# Patient Record
Sex: FEMALE | Race: WHITE | NOT HISPANIC OR LATINO | Employment: OTHER | URBAN - METROPOLITAN AREA
[De-identification: names, ages, dates, MRNs, and addresses within clinical notes are randomized per-mention and may not be internally consistent; named-entity substitution may affect disease eponyms.]

---

## 2017-02-17 ENCOUNTER — COMMUNICATION - HEALTHEAST (OUTPATIENT)
Dept: FAMILY MEDICINE | Facility: CLINIC | Age: 62
End: 2017-02-17

## 2017-02-17 DIAGNOSIS — E11.9 DIABETES (H): ICD-10-CM

## 2017-02-28 ENCOUNTER — COMMUNICATION - HEALTHEAST (OUTPATIENT)
Dept: FAMILY MEDICINE | Facility: CLINIC | Age: 62
End: 2017-02-28

## 2017-02-28 DIAGNOSIS — E11.9 DIABETES (H): ICD-10-CM

## 2017-04-08 ENCOUNTER — COMMUNICATION - HEALTHEAST (OUTPATIENT)
Dept: FAMILY MEDICINE | Facility: CLINIC | Age: 62
End: 2017-04-08

## 2017-04-08 DIAGNOSIS — E78.5 HYPERLIPIDEMIA: ICD-10-CM

## 2017-05-14 ENCOUNTER — COMMUNICATION - HEALTHEAST (OUTPATIENT)
Dept: FAMILY MEDICINE | Facility: CLINIC | Age: 62
End: 2017-05-14

## 2017-05-14 DIAGNOSIS — E78.5 HYPERLIPIDEMIA: ICD-10-CM

## 2017-07-06 ENCOUNTER — AMBULATORY - HEALTHEAST (OUTPATIENT)
Dept: MULTI SPECIALTY CLINIC | Facility: CLINIC | Age: 62
End: 2017-07-06

## 2017-07-08 ENCOUNTER — COMMUNICATION - HEALTHEAST (OUTPATIENT)
Dept: FAMILY MEDICINE | Facility: CLINIC | Age: 62
End: 2017-07-08

## 2017-07-08 DIAGNOSIS — E78.5 HYPERLIPIDEMIA: ICD-10-CM

## 2018-05-09 ENCOUNTER — OFFICE VISIT - HEALTHEAST (OUTPATIENT)
Dept: FAMILY MEDICINE | Facility: CLINIC | Age: 63
End: 2018-05-09

## 2018-05-09 DIAGNOSIS — R06.02 SHORTNESS OF BREATH: ICD-10-CM

## 2018-05-09 DIAGNOSIS — Z12.11 SCREEN FOR COLON CANCER: ICD-10-CM

## 2018-05-09 DIAGNOSIS — M79.89 LEG SWELLING: ICD-10-CM

## 2018-05-09 DIAGNOSIS — R06.02 SOB (SHORTNESS OF BREATH): ICD-10-CM

## 2018-05-09 DIAGNOSIS — Z12.31 VISIT FOR SCREENING MAMMOGRAM: ICD-10-CM

## 2018-05-09 LAB
ATRIAL RATE - MUSE: 65 BPM
DIASTOLIC BLOOD PRESSURE - MUSE: NORMAL MMHG
INTERPRETATION ECG - MUSE: NORMAL
P AXIS - MUSE: 44 DEGREES
PR INTERVAL - MUSE: 178 MS
QRS DURATION - MUSE: 94 MS
QT - MUSE: 426 MS
QTC - MUSE: 443 MS
R AXIS - MUSE: -28 DEGREES
SYSTOLIC BLOOD PRESSURE - MUSE: NORMAL MMHG
T AXIS - MUSE: 48 DEGREES
VENTRICULAR RATE- MUSE: 65 BPM

## 2018-05-09 ASSESSMENT — MIFFLIN-ST. JEOR: SCORE: 1306.53

## 2018-05-09 NOTE — ASSESSMENT & PLAN NOTE
Highly suspicious of left leg DVT as well as pulmonary embolism.  Likely onset of the DVT is 4/23/2018.  Likely onset of the pulmonary embolism was 4/26/2018.  There is positive history of a plane ride to [place] before this started and she was able to do everything she wanted to in [place] but did note leg pain and swelling in the left leg as well as shortness of breath fairly constantly throughout her trip and continuing until she has now returned here to [place].    Recommend: Ambulance to emergency room.  They declined this and planto drive straight to [facility] right now.  I did call over to [facility] and talk to a physician's assistant named Pam who understands that they will be coming over.  Plan for left lower leg ultrasound and CT PE run.    EKG was done today and there are some EKG changes noted from previous EKG done in 2015 these may or may not be related to a pulmonary embolism.  The patient is aware of this and still declined ambulance today

## 2018-05-14 ENCOUNTER — COMMUNICATION - HEALTHEAST (OUTPATIENT)
Dept: FAMILY MEDICINE | Facility: CLINIC | Age: 63
End: 2018-05-14

## 2018-05-14 ENCOUNTER — AMBULATORY - HEALTHEAST (OUTPATIENT)
Dept: LAB | Facility: CLINIC | Age: 63
End: 2018-05-14

## 2018-05-14 DIAGNOSIS — I26.99 OTHER ACUTE PULMONARY EMBOLISM WITHOUT ACUTE COR PULMONALE (H): ICD-10-CM

## 2018-05-14 DIAGNOSIS — I26.99 PULMONARY EMBOLISM (H): ICD-10-CM

## 2018-05-14 LAB — INR PPP: 1.4 (ref 0.9–1.1)

## 2018-05-17 ENCOUNTER — OFFICE VISIT - HEALTHEAST (OUTPATIENT)
Dept: FAMILY MEDICINE | Facility: CLINIC | Age: 63
End: 2018-05-17

## 2018-05-17 DIAGNOSIS — Z51.81 ANTICOAGULATION GOAL OF INR 2 TO 3: ICD-10-CM

## 2018-05-17 DIAGNOSIS — Z79.01 ANTICOAGULATION GOAL OF INR 2 TO 3: ICD-10-CM

## 2018-05-17 DIAGNOSIS — I26.99 PULMONARY EMBOLISM (H): ICD-10-CM

## 2018-05-17 LAB — INR PPP: 2.4 (ref 0.9–1.1)

## 2018-05-17 ASSESSMENT — MIFFLIN-ST. JEOR: SCORE: 1288.39

## 2018-05-17 NOTE — ASSESSMENT & PLAN NOTE
HOSPITAL FOLLOW UP DVT/PULMONARY EMBOLISM dx 5/9/2018 - needs anticoagulation therapy.  Lab Results   Component Value Date    INR 2.40 (H) 05/17/2018    INR 1.40 (H) 05/14/2018    INR 1.14 (H) 05/11/2018       Currently taking lovenox and coumadin.  Coumadin dose has been 5mg po q day for the past week.   Since she is therapeutic today will continue coumadin 5mg po q day  Discontinue lovenox  Follow up inr in one week and titrate if needed, but hopefully it will be stable.   She plans to return home to [place] 5/28/2018.    The fact that her leg on the left has been swollen for the past 1 year and since she started anticoagulation therapy has resolved makes me wonder if she has been having recurrent blood clots in that left leg over the past year.  Additionally,  she had a clot back in 1987 which was thought to be due to oral contraceptive pills.  This would make me lean towards chronic anticoagulation therapy.  I will defer to her primary physician who knows her better..

## 2018-05-24 ENCOUNTER — OFFICE VISIT - HEALTHEAST (OUTPATIENT)
Dept: FAMILY MEDICINE | Facility: CLINIC | Age: 63
End: 2018-05-24

## 2018-05-24 DIAGNOSIS — Z79.01 ANTICOAGULATION GOAL OF INR 2 TO 3: ICD-10-CM

## 2018-05-24 DIAGNOSIS — I26.99 OTHER ACUTE PULMONARY EMBOLISM WITHOUT ACUTE COR PULMONALE (H): ICD-10-CM

## 2018-05-24 DIAGNOSIS — Z51.81 ANTICOAGULATION GOAL OF INR 2 TO 3: ICD-10-CM

## 2018-05-24 DIAGNOSIS — I26.99 PULMONARY EMBOLISM (H): ICD-10-CM

## 2018-05-24 LAB — INR PPP: 3.5 (ref 0.9–1.1)

## 2018-05-24 NOTE — ASSESSMENT & PLAN NOTE
Lab Results   Component Value Date    INR 3.50 (H) 05/24/2018    INR 2.40 (H) 05/17/2018    INR 1.40 (H) 05/14/2018      She has been taking Coumadin 5 mg orally per day.  Because the goal INR is between 2 and 3 and she is at 3.5 today I will decrease her dose.    Start Coumadin 4 mg orally per day on Monday Wednesday and Friday.  Start Coumadin 5 mg orally per day on Tuesday, Thursday, Saturday and Sunday.    Recheck INR on 5/30/2018 or 5/31/2018 when she is back at home in [place].

## 2020-11-24 ENCOUNTER — OFFICE VISIT - HEALTHEAST (OUTPATIENT)
Dept: FAMILY MEDICINE | Facility: CLINIC | Age: 65
End: 2020-11-24

## 2020-11-24 DIAGNOSIS — R01.1 UNDIAGNOSED CARDIAC MURMURS: ICD-10-CM

## 2020-11-24 DIAGNOSIS — Z51.81 ENCOUNTER FOR THERAPEUTIC DRUG MONITORING: ICD-10-CM

## 2020-11-24 DIAGNOSIS — Z12.31 ENCOUNTER FOR SCREENING MAMMOGRAM FOR MALIGNANT NEOPLASM OF BREAST: ICD-10-CM

## 2020-11-24 DIAGNOSIS — E78.2 MIXED HYPERLIPIDEMIA: ICD-10-CM

## 2020-11-24 DIAGNOSIS — M06.9 RHEUMATOID ARTHRITIS INVOLVING BOTH HANDS, UNSPECIFIED WHETHER RHEUMATOID FACTOR PRESENT (H): ICD-10-CM

## 2020-11-24 DIAGNOSIS — I10 ESSENTIAL HYPERTENSION: ICD-10-CM

## 2020-11-24 DIAGNOSIS — K21.00 GASTROESOPHAGEAL REFLUX DISEASE WITH ESOPHAGITIS WITHOUT HEMORRHAGE: ICD-10-CM

## 2020-11-24 DIAGNOSIS — R51.9 NONINTRACTABLE HEADACHE, UNSPECIFIED CHRONICITY PATTERN, UNSPECIFIED HEADACHE TYPE: ICD-10-CM

## 2020-11-24 DIAGNOSIS — R13.10 DYSPHAGIA, UNSPECIFIED TYPE: ICD-10-CM

## 2020-11-24 DIAGNOSIS — M25.50 PAIN IN JOINT, MULTIPLE SITES: ICD-10-CM

## 2020-11-24 DIAGNOSIS — E11.8 TYPE 2 DIABETES MELLITUS WITH COMPLICATION, WITHOUT LONG-TERM CURRENT USE OF INSULIN (H): ICD-10-CM

## 2020-11-24 LAB
ALBUMIN SERPL-MCNC: 3.8 G/DL (ref 3.5–5)
ALP SERPL-CCNC: 107 U/L (ref 45–120)
ALT SERPL W P-5'-P-CCNC: 32 U/L (ref 0–45)
ANION GAP SERPL CALCULATED.3IONS-SCNC: 13 MMOL/L (ref 5–18)
AST SERPL W P-5'-P-CCNC: 30 U/L (ref 0–40)
BASOPHILS # BLD AUTO: 0.1 THOU/UL (ref 0–0.2)
BASOPHILS NFR BLD AUTO: 1 % (ref 0–2)
BILIRUB SERPL-MCNC: 0.7 MG/DL (ref 0–1)
BUN SERPL-MCNC: 24 MG/DL (ref 8–22)
CALCIUM SERPL-MCNC: 8.8 MG/DL (ref 8.5–10.5)
CHLORIDE BLD-SCNC: 102 MMOL/L (ref 98–107)
CO2 SERPL-SCNC: 24 MMOL/L (ref 22–31)
CREAT SERPL-MCNC: 1.04 MG/DL (ref 0.6–1.1)
EOSINOPHIL # BLD AUTO: 0.3 THOU/UL (ref 0–0.4)
EOSINOPHIL NFR BLD AUTO: 4 % (ref 0–6)
ERYTHROCYTE [DISTWIDTH] IN BLOOD BY AUTOMATED COUNT: 10.7 % (ref 11–14.5)
GFR SERPL CREATININE-BSD FRML MDRD: 53 ML/MIN/1.73M2
GLUCOSE BLD-MCNC: 206 MG/DL (ref 70–125)
HBA1C MFR BLD: 7.2 %
HCT VFR BLD AUTO: 45.1 % (ref 35–47)
HGB BLD-MCNC: 15.4 G/DL (ref 12–16)
LYMPHOCYTES # BLD AUTO: 2.3 THOU/UL (ref 0.8–4.4)
LYMPHOCYTES NFR BLD AUTO: 28 % (ref 20–40)
MCH RBC QN AUTO: 33.6 PG (ref 27–34)
MCHC RBC AUTO-ENTMCNC: 34.2 G/DL (ref 32–36)
MCV RBC AUTO: 98 FL (ref 80–100)
MONOCYTES # BLD AUTO: 0.9 THOU/UL (ref 0–0.9)
MONOCYTES NFR BLD AUTO: 10 % (ref 2–10)
NEUTROPHILS # BLD AUTO: 4.7 THOU/UL (ref 2–7.7)
NEUTROPHILS NFR BLD AUTO: 57 % (ref 50–70)
PLATELET # BLD AUTO: 284 THOU/UL (ref 140–440)
PMV BLD AUTO: 7.8 FL (ref 7–10)
POTASSIUM BLD-SCNC: 3.6 MMOL/L (ref 3.5–5)
PROT SERPL-MCNC: 7.1 G/DL (ref 6–8)
RBC # BLD AUTO: 4.58 MILL/UL (ref 3.8–5.4)
SODIUM SERPL-SCNC: 139 MMOL/L (ref 136–145)
WBC: 8.2 THOU/UL (ref 4–11)

## 2020-11-24 RX ORDER — DULOXETIN HYDROCHLORIDE 20 MG/1
20 CAPSULE, DELAYED RELEASE ORAL AT BEDTIME
Qty: 90 CAPSULE | Refills: 3 | Status: SHIPPED | OUTPATIENT
Start: 2020-11-24 | End: 2021-09-29

## 2020-11-24 RX ORDER — GABAPENTIN 300 MG/1
600 CAPSULE ORAL AT BEDTIME
Qty: 90 CAPSULE | Refills: 3 | Status: SHIPPED | OUTPATIENT
Start: 2020-11-24 | End: 2021-09-29

## 2020-11-24 NOTE — ASSESSMENT & PLAN NOTE
BP Readings from Last 3 Encounters:   11/24/20 100/60   05/24/18 110/62   05/17/18 100/62      discharge Cozaar 100mg po q day - need to discontinue as it may be causing a cough.    Increase hydrochlorothiazide 12.5 mg to 25 mg po q day.    Becauseof cough we need to discontinue cozaar - will increase hydrochlorothiazide and have her in for bp check in 1 month.

## 2020-11-24 NOTE — ASSESSMENT & PLAN NOTE
She takes omeprazole 20 m po q am - works well for gerd. Had egd in the past and she says it was benign.

## 2020-11-24 NOTE — ASSESSMENT & PLAN NOTE
Dueto the busy schedule and the amount of time I've already spent with her, time does not permit me to address this issues at today's visit. I've requested another appointment to review these additional issues. I have openings as soon as tomorrow.

## 2020-11-24 NOTE — ASSESSMENT & PLAN NOTE
She has not been seen by cardiology in over 5 years.  Needs reevaluation and updated recommendation. Cardiology consult ordered.

## 2020-11-24 NOTE — ASSESSMENT & PLAN NOTE
Has seen a rheumatologist in [place] most recently.  Will refer back to rheumatology here - saw Dr. Wagoner in the past.     Says she has been on methotrexate 2.5mg dose I do not prescribe this so referral to rheum placed.     She also takes gabapentin 600mg po q hs for pain, that is refilled today.     cymbalta 20mg po q day prescribed forpain. Refilled.

## 2020-11-24 NOTE — ASSESSMENT & PLAN NOTE
Diabetic eye exam scheduled for January  Continue Januvia 100mg po q day , prandin 2mg po bid, farxiga 5 mg po q day.     Recommend increase prandin to three times a day.     Plan recheck a1c in 3 months.

## 2020-11-25 ENCOUNTER — COMMUNICATION - HEALTHEAST (OUTPATIENT)
Dept: SCHEDULING | Facility: CLINIC | Age: 65
End: 2020-11-25

## 2020-12-08 ENCOUNTER — OFFICE VISIT - HEALTHEAST (OUTPATIENT)
Dept: CARDIOLOGY | Facility: CLINIC | Age: 65
End: 2020-12-08

## 2020-12-08 DIAGNOSIS — R06.09 DYSPNEA ON EXERTION: ICD-10-CM

## 2020-12-08 DIAGNOSIS — E78.5 DYSLIPIDEMIA, GOAL LDL BELOW 100: ICD-10-CM

## 2020-12-08 DIAGNOSIS — I10 ESSENTIAL HYPERTENSION: ICD-10-CM

## 2020-12-08 DIAGNOSIS — R01.1 HEART MURMUR: ICD-10-CM

## 2020-12-08 ASSESSMENT — MIFFLIN-ST. JEOR: SCORE: 1302

## 2020-12-11 ENCOUNTER — COMMUNICATION - HEALTHEAST (OUTPATIENT)
Dept: SCHEDULING | Facility: CLINIC | Age: 65
End: 2020-12-11

## 2020-12-23 ENCOUNTER — OFFICE VISIT - HEALTHEAST (OUTPATIENT)
Dept: FAMILY MEDICINE | Facility: CLINIC | Age: 65
End: 2020-12-23

## 2020-12-23 DIAGNOSIS — I10 ESSENTIAL HYPERTENSION: ICD-10-CM

## 2020-12-23 DIAGNOSIS — Z13.220 LIPID SCREENING: ICD-10-CM

## 2020-12-23 DIAGNOSIS — E78.5 DYSLIPIDEMIA, GOAL LDL BELOW 100: ICD-10-CM

## 2020-12-23 DIAGNOSIS — M25.50 PAIN IN JOINT, MULTIPLE SITES: ICD-10-CM

## 2020-12-23 DIAGNOSIS — R01.1 UNDIAGNOSED CARDIAC MURMURS: ICD-10-CM

## 2020-12-23 LAB
ANION GAP SERPL CALCULATED.3IONS-SCNC: 13 MMOL/L (ref 5–18)
BUN SERPL-MCNC: 26 MG/DL (ref 8–22)
CALCIUM SERPL-MCNC: 9.3 MG/DL (ref 8.5–10.5)
CHLORIDE BLD-SCNC: 102 MMOL/L (ref 98–107)
CHOLEST SERPL-MCNC: 173 MG/DL
CO2 SERPL-SCNC: 26 MMOL/L (ref 22–31)
CREAT SERPL-MCNC: 0.99 MG/DL (ref 0.6–1.1)
FASTING STATUS PATIENT QL REPORTED: YES
GFR SERPL CREATININE-BSD FRML MDRD: 56 ML/MIN/1.73M2
GLUCOSE BLD-MCNC: 240 MG/DL (ref 70–125)
HDLC SERPL-MCNC: 46 MG/DL
LDLC SERPL CALC-MCNC: 97 MG/DL
POTASSIUM BLD-SCNC: 4.1 MMOL/L (ref 3.5–5)
SODIUM SERPL-SCNC: 141 MMOL/L (ref 136–145)
TRIGL SERPL-MCNC: 148 MG/DL

## 2020-12-23 NOTE — ASSESSMENT & PLAN NOTE
She is waiting to make appt w rheum as they do everything virtually and she wasn't sure she should do it virtually, but now decided to go ahead.     Managed by Dr. Wagoner. She is planning to schedule appt w his team.

## 2020-12-23 NOTE — ASSESSMENT & PLAN NOTE
BP Readings from Last 3 Encounters:   12/23/20 124/80   12/08/20 140/90   11/24/20 100/60      Hydrochlorothiazide 25 mg po q day  Losartan 100mg po q day  prandin 2mg three times a day.  Due to the med changes will check bmp again.   Follow up in6 months

## 2021-01-04 ENCOUNTER — COMMUNICATION - HEALTHEAST (OUTPATIENT)
Dept: FAMILY MEDICINE | Facility: CLINIC | Age: 66
End: 2021-01-04

## 2021-01-14 ENCOUNTER — HOSPITAL ENCOUNTER (OUTPATIENT)
Dept: MRI IMAGING | Facility: HOSPITAL | Age: 66
Discharge: HOME | End: 2021-01-14
Attending: INTERNAL MEDICINE

## 2021-01-14 DIAGNOSIS — R01.1 HEART MURMUR: ICD-10-CM

## 2021-01-14 DIAGNOSIS — R06.09 DYSPNEA ON EXERTION: ICD-10-CM

## 2021-01-14 LAB
ATRIAL RATE - MUSE: 77 BPM
CCTA EJECTION FRACTION: 68 %
CCTA INTERVENTRICULAR SETPUM: 1.1 CM (ref 0.6–1.1)
CCTA LEFT INTERNAL DIMENSION IN SYSTOLE: 2.4 CM (ref 2.1–4)
CCTA LEFT VENTRICULAR INTERNAL DIMENSION IN DIASTOLE: 4 CM (ref 3.5–6)
CCTA LEFT VENTRICULAR MASS: 109.69 G
CCTA POSTERIOR WALL: 0.7 CM (ref 0.6–1.1)
DIASTOLIC BLOOD PRESSURE - MUSE: NORMAL
INTERPRETATION ECG - MUSE: NORMAL
MR CARDIAC LEFT VENTRIAL CARDIAC INDEX: 2.5 L/MIN/M2 (ref 1.75–3.8)
MR CARDIAC LEFT VENTRICAL CARDIAC OUTPUT: 4.5 L/MIN (ref 2.8–8.8)
MR CARDIAC LEFT VENTRICULAR DIASTOLIC VOLUME INDEX: 59.85 ML/M2 (ref 41–81)
MR CARDIAC LEFT VENTRICULAR MASS INDEX: 55.97 G/M2 (ref 63–95)
MR CARDIAC LEFT VENTRICULAR MASS: 101 G (ref 75–175)
MR CARDIAC LEFT VENTRICULAR STROKE VOLUME INDEX: 36.02 ML/M2 (ref 26–56)
MR CARDIAC LEFT VENTRICULAR SYSTOLIC VOLUME INDEX: 23.83 ML/M2 (ref 12–20)
MR EJECTION FRACTION: 60.19 %
MR HEIGHT: 1.6 M
MR LEFT VENTRICULAR DYSTOLIC VOLUMEN: 108 ML (ref 52–141)
MR LEFT VENTRICULAR STROKE VOLUMEN: 65 ML (ref 33–97)
MR LEFT VENTRICULAR SYSTOLIC VOLUME: 43 ML (ref 13–51)
MR WEIGHT: 77.1 KG
P AXIS - MUSE: 52 DEGREES
PR INTERVAL - MUSE: 180 MS
QRS DURATION - MUSE: 96 MS
QT - MUSE: 436 MS
QTC - MUSE: 493 MS
R AXIS - MUSE: -36 DEGREES
SYSTOLIC BLOOD PRESSURE - MUSE: NORMAL
T AXIS - MUSE: 39 DEGREES
VENTRICULAR RATE- MUSE: 77 BPM

## 2021-01-14 ASSESSMENT — MIFFLIN-ST. JEOR: SCORE: 1270.24

## 2021-01-15 ENCOUNTER — AMBULATORY - HEALTHEAST (OUTPATIENT)
Dept: CARDIOLOGY | Facility: CLINIC | Age: 66
End: 2021-01-15

## 2021-01-15 DIAGNOSIS — R06.09 DYSPNEA ON EXERTION: ICD-10-CM

## 2021-01-15 LAB
ATRIAL RATE - MUSE: 80 BPM
DIASTOLIC BLOOD PRESSURE - MUSE: NORMAL
INTERPRETATION ECG - MUSE: NORMAL
P AXIS - MUSE: 21 DEGREES
PR INTERVAL - MUSE: 184 MS
QRS DURATION - MUSE: 98 MS
QT - MUSE: 424 MS
QTC - MUSE: 489 MS
R AXIS - MUSE: -36 DEGREES
SYSTOLIC BLOOD PRESSURE - MUSE: NORMAL
T AXIS - MUSE: 44 DEGREES
VENTRICULAR RATE- MUSE: 80 BPM

## 2021-01-25 ENCOUNTER — RECORDS - HEALTHEAST (OUTPATIENT)
Dept: ADMINISTRATIVE | Facility: OTHER | Age: 66
End: 2021-01-25

## 2021-01-25 LAB — RETINOPATHY: NEGATIVE

## 2021-01-27 ENCOUNTER — HOSPITAL ENCOUNTER (OUTPATIENT)
Dept: CARDIOLOGY | Facility: HOSPITAL | Age: 66
Discharge: HOME | End: 2021-01-27
Attending: INTERNAL MEDICINE

## 2021-01-27 DIAGNOSIS — R06.09 DYSPNEA ON EXERTION: ICD-10-CM

## 2021-01-27 LAB
AORTIC VALVE MEAN VELOCITY: 139 CM/S
AR DECEL SLOPE: 2110 MM/S2
AR DECEL SLOPE: 2240 MM/S2
AR DECEL SLOPE: 2360 MM/S2
AR PEAK VELOCITY: 387 CM/S
AR PEAK VELOCITY: 397 CM/S
AR PEAK VELOCITY: 406 CM/S
AV MEAN GRADIENT: 9 MMHG
AV REGURGITATION PRESSURE HALF TIME: 504 MS
AV REGURGITATION PRESSURE HALF TIME: 521 MS
AV REGURGITATION PRESSURE HALF TIME: 538 MS
CV STRESS CURRENT BP HE: NORMAL
CV STRESS CURRENT HR HE: 101
CV STRESS CURRENT HR HE: 103
CV STRESS CURRENT HR HE: 104
CV STRESS CURRENT HR HE: 107
CV STRESS CURRENT HR HE: 107
CV STRESS CURRENT HR HE: 108
CV STRESS CURRENT HR HE: 109
CV STRESS CURRENT HR HE: 117
CV STRESS CURRENT HR HE: 127
CV STRESS CURRENT HR HE: 132
CV STRESS CURRENT HR HE: 72
CV STRESS CURRENT HR HE: 72
CV STRESS CURRENT HR HE: 74
CV STRESS CURRENT HR HE: 74
CV STRESS CURRENT HR HE: 75
CV STRESS CURRENT HR HE: 76
CV STRESS CURRENT HR HE: 81
CV STRESS CURRENT HR HE: 81
CV STRESS CURRENT HR HE: 87
CV STRESS CURRENT HR HE: 88
CV STRESS CURRENT HR HE: 90
CV STRESS CURRENT HR HE: 97
CV STRESS DEVIATION TIME HE: NORMAL
CV STRESS ECHO PERCENT HR HE: NORMAL
CV STRESS EXERCISE STAGE HE: NORMAL
CV STRESS FINAL RESTING BP HE: NORMAL
CV STRESS FINAL RESTING HR HE: 72
CV STRESS MAX HR HE: 132
CV STRESS MAX TREADMILL GRADE HE: 12
CV STRESS MAX TREADMILL SPEED HE: 2.5
CV STRESS PEAK DIA BP HE: NORMAL
CV STRESS PEAK SYS BP HE: NORMAL
CV STRESS PHASE HE: NORMAL
CV STRESS PROTOCOL HE: NORMAL
CV STRESS RESTING PT POSITION HE: NORMAL
CV STRESS RESTING PT POSITION HE: NORMAL
CV STRESS ST DEVIATION AMOUNT HE: NORMAL
CV STRESS ST DEVIATION ELEVATION HE: NORMAL
CV STRESS ST EVELATION AMOUNT HE: NORMAL
CV STRESS TEST TYPE HE: NORMAL
CV STRESS TOTAL STAGE TIME MIN 1 HE: NORMAL
DOP CALC AO PEAK VEL: 218 CM/S
DOP CALC AO PEAK VEL: 218 CM/S
DOP CALC AO VTI: 46.7 CM
DOP CALC MV VTI: 36.5 CM
DOP CALC MV VTI: 43.7 CM
DOP CALC MV VTI: 46 CM
DOP CALC MV VTI: 48.5 CM
MITRAL VALVE MEAN INFLOW VELOCITY: 129 CM/S
MITRAL VALVE MEAN INFLOW VELOCITY: 142 CM/S
MITRAL VALVE MEAN INFLOW VELOCITY: 151 CM/S
MITRAL VALVE MEAN INFLOW VELOCITY: 93.3 CM/S
MITRAL VALVE PEAK VELOCITY: 138 CM/S
MITRAL VALVE PEAK VELOCITY: 191 CM/S
MITRAL VALVE PEAK VELOCITY: 211 CM/S
MITRAL VALVE PEAK VELOCITY: 224 CM/S
MV MEAN GRADIENT: 11 MMHG
MV MEAN GRADIENT: 4 MMHG
MV MEAN GRADIENT: 8 MMHG
MV MEAN GRADIENT: 9 MMHG
RATE PRESSURE PRODUCT: NORMAL
STRESS ECHO BASELINE DIASTOLIC HE: 83
STRESS ECHO BASELINE HR: 72
STRESS ECHO BASELINE SYSTOLIC BP: 144
STRESS ECHO CALCULATED PERCENT HR: 86 %
STRESS ECHO LAST STRESS DIASTOLIC BP: 78
STRESS ECHO LAST STRESS HR: 127
STRESS ECHO LAST STRESS SYSTOLIC BP: 140
STRESS ECHO POST ESTIMATED WORKLOAD: 6.6
STRESS ECHO POST EXERCISE DUR MIN: 4
STRESS ECHO POST EXERCISE DUR SEC: 45
STRESS ECHO TARGET HR: 153
TRICUSPID VALVE PEAK REGURGITANT VELOCITY: 250 CM/S
TRICUSPID VALVE PEAK REGURGITANT VELOCITY: 252 CM/S
TRICUSPID VALVE PEAK REGURGITANT VELOCITY: 341 CM/S

## 2021-02-01 ENCOUNTER — RECORDS - HEALTHEAST (OUTPATIENT)
Dept: HEALTH INFORMATION MANAGEMENT | Facility: CLINIC | Age: 66
End: 2021-02-01

## 2021-02-10 ENCOUNTER — COMMUNICATION - HEALTHEAST (OUTPATIENT)
Dept: FAMILY MEDICINE | Facility: CLINIC | Age: 66
End: 2021-02-10

## 2021-02-10 DIAGNOSIS — I10 ESSENTIAL HYPERTENSION: ICD-10-CM

## 2021-02-10 DIAGNOSIS — E78.2 MIXED HYPERLIPIDEMIA: ICD-10-CM

## 2021-02-10 DIAGNOSIS — E78.5 DYSLIPIDEMIA, GOAL LDL BELOW 100: ICD-10-CM

## 2021-02-10 RX ORDER — HYDROCHLOROTHIAZIDE 25 MG/1
25 TABLET ORAL DAILY
Qty: 90 TABLET | Refills: 3 | Status: SHIPPED | OUTPATIENT
Start: 2021-02-10 | End: 2021-10-20

## 2021-02-10 RX ORDER — ATORVASTATIN CALCIUM 20 MG/1
20 TABLET, FILM COATED ORAL DAILY
Qty: 90 TABLET | Refills: 3 | Status: SHIPPED | OUTPATIENT
Start: 2021-02-10 | End: 2021-10-20

## 2021-02-10 NOTE — ASSESSMENT & PLAN NOTE
Lab Results   Component Value Date    CHOL 173 12/23/2020    CHOL 130 04/06/2015     149 06/16/2014     Lab Results   Component Value Date    HDL 46 (L) 12/23/2020     53 04/06/2015    HDL 48 06/16/2014     Lab Results   Component Value Date     97 12/23/2020    LDLCALC 62 04/06/2015    LDLCALC 81 06/16/2014     Lab Results   Component Value Date    TRIG 148 12/23/2020    TRIG 76 04/06/2015    TRIG 100 06/16/2014     No components found for: CHOLHDL     Refilled lipitor 20 mg po q day.

## 2021-02-10 NOTE — ASSESSMENT & PLAN NOTE
BP Readings from Last 3 Encounters:   01/14/21 117/61   12/23/20 124/80   12/08/20 140/90      Hydrochlorothiazide 25 mg po q day refilled.   Losartan 100 mg po q day

## 2021-02-12 ENCOUNTER — COMMUNICATION - HEALTHEAST (OUTPATIENT)
Dept: CARDIOLOGY | Facility: CLINIC | Age: 66
End: 2021-02-12

## 2021-02-15 ENCOUNTER — OFFICE VISIT - HEALTHEAST (OUTPATIENT)
Dept: CARDIOLOGY | Facility: CLINIC | Age: 66
End: 2021-02-15

## 2021-02-15 DIAGNOSIS — E78.5 DYSLIPIDEMIA, GOAL LDL BELOW 100: ICD-10-CM

## 2021-02-15 DIAGNOSIS — I10 ESSENTIAL HYPERTENSION: ICD-10-CM

## 2021-02-15 DIAGNOSIS — I35.0 MILD AORTIC VALVE STENOSIS: ICD-10-CM

## 2021-02-15 ASSESSMENT — MIFFLIN-ST. JEOR: SCORE: 1330.57

## 2021-02-23 ENCOUNTER — COMMUNICATION - HEALTHEAST (OUTPATIENT)
Dept: FAMILY MEDICINE | Facility: CLINIC | Age: 66
End: 2021-02-23

## 2021-02-23 DIAGNOSIS — E11.9 DIABETES MELLITUS (H): ICD-10-CM

## 2021-03-01 ENCOUNTER — COMMUNICATION - HEALTHEAST (OUTPATIENT)
Dept: FAMILY MEDICINE | Facility: CLINIC | Age: 66
End: 2021-03-01

## 2021-03-01 ENCOUNTER — COMMUNICATION - HEALTHEAST (OUTPATIENT)
Dept: ADMINISTRATIVE | Facility: CLINIC | Age: 66
End: 2021-03-01

## 2021-03-01 DIAGNOSIS — E11.8 TYPE 2 DIABETES MELLITUS WITH COMPLICATION, WITHOUT LONG-TERM CURRENT USE OF INSULIN (H): ICD-10-CM

## 2021-03-01 RX ORDER — NYSTATIN 100000/G
POWDER (GRAM) TOPICAL
Qty: 15 G | Refills: 3 | Status: SHIPPED | OUTPATIENT
Start: 2021-03-01 | End: 2021-10-15

## 2021-03-29 ENCOUNTER — COMMUNICATION - HEALTHEAST (OUTPATIENT)
Dept: FAMILY MEDICINE | Facility: CLINIC | Age: 66
End: 2021-03-29

## 2021-03-29 DIAGNOSIS — I10 ESSENTIAL HYPERTENSION: ICD-10-CM

## 2021-03-31 RX ORDER — LOSARTAN POTASSIUM 100 MG/1
100 TABLET ORAL DAILY
Qty: 90 TABLET | Refills: 0 | Status: SHIPPED | OUTPATIENT
Start: 2021-03-31 | End: 2021-10-20

## 2021-05-01 ENCOUNTER — HEALTH MAINTENANCE LETTER (OUTPATIENT)
Age: 66
End: 2021-05-01

## 2021-06-01 VITALS — WEIGHT: 179 LBS | BODY MASS INDEX: 31.71 KG/M2

## 2021-06-01 VITALS — HEIGHT: 63 IN | WEIGHT: 178 LBS | BODY MASS INDEX: 31.54 KG/M2

## 2021-06-01 VITALS — BODY MASS INDEX: 30.83 KG/M2 | WEIGHT: 174 LBS | HEIGHT: 63 IN

## 2021-06-05 ENCOUNTER — RECORDS - HEALTHEAST (OUTPATIENT)
Dept: FAMILY MEDICINE | Facility: CLINIC | Age: 66
End: 2021-06-05

## 2021-06-05 VITALS
DIASTOLIC BLOOD PRESSURE: 90 MMHG | RESPIRATION RATE: 14 BRPM | BODY MASS INDEX: 31.36 KG/M2 | SYSTOLIC BLOOD PRESSURE: 140 MMHG | HEIGHT: 63 IN | HEART RATE: 84 BPM | WEIGHT: 177 LBS

## 2021-06-05 VITALS
BODY MASS INDEX: 31.35 KG/M2 | OXYGEN SATURATION: 92 % | HEART RATE: 79 BPM | WEIGHT: 177 LBS | DIASTOLIC BLOOD PRESSURE: 60 MMHG | SYSTOLIC BLOOD PRESSURE: 100 MMHG

## 2021-06-05 VITALS
DIASTOLIC BLOOD PRESSURE: 66 MMHG | HEART RATE: 76 BPM | WEIGHT: 179.8 LBS | SYSTOLIC BLOOD PRESSURE: 118 MMHG | RESPIRATION RATE: 16 BRPM | BODY MASS INDEX: 30.7 KG/M2 | HEIGHT: 64 IN

## 2021-06-05 VITALS
SYSTOLIC BLOOD PRESSURE: 124 MMHG | HEART RATE: 80 BPM | WEIGHT: 178 LBS | RESPIRATION RATE: 16 BRPM | BODY MASS INDEX: 31.53 KG/M2 | DIASTOLIC BLOOD PRESSURE: 80 MMHG

## 2021-06-05 VITALS — WEIGHT: 170 LBS | HEIGHT: 63 IN | BODY MASS INDEX: 30.12 KG/M2

## 2021-06-05 DIAGNOSIS — M23.90 INTERNAL DERANGEMENT OF KNEE: ICD-10-CM

## 2021-06-05 DIAGNOSIS — M25.569 PAIN IN JOINT, LOWER LEG: ICD-10-CM

## 2021-06-08 ENCOUNTER — COMMUNICATION - HEALTHEAST (OUTPATIENT)
Dept: ADMINISTRATIVE | Facility: CLINIC | Age: 66
End: 2021-06-08

## 2021-06-08 DIAGNOSIS — E11.8 TYPE 2 DIABETES MELLITUS WITH COMPLICATION, WITHOUT LONG-TERM CURRENT USE OF INSULIN (H): ICD-10-CM

## 2021-06-09 RX ORDER — DAPAGLIFLOZIN 5 MG/1
5 TABLET, FILM COATED ORAL DAILY
Qty: 90 TABLET | Refills: 0 | Status: SHIPPED | OUTPATIENT
Start: 2021-06-09 | End: 2021-09-19

## 2021-06-09 RX ORDER — REPAGLINIDE 2 MG/1
2 TABLET ORAL
Qty: 270 TABLET | Refills: 0 | Status: SHIPPED | OUTPATIENT
Start: 2021-06-09 | End: 2021-09-29

## 2021-06-13 NOTE — TELEPHONE ENCOUNTER
Losartan was discontinue due to concern that it was causing a cough. Ok to restart, but will need video visit to change bp med if cough problem persists.

## 2021-06-13 NOTE — TELEPHONE ENCOUNTER
Two weeks ago saw Dr Gabriel.    Discontinued Losartan do to low bp.    On 8th at cardiology visit was 140/90    Today 164/115    Headache - constant and increasing in severity throughout the day. She said she's ready to go to bed by 6pm.    Lizabeth wants to know if she should restart her losartan.    Please address with Lizabeth.  She still has losartan at home.    Questions answered.  Message sent high priority to pcp.    COVID 19 Nurse Triage Plan/Patient Instructions    Please be aware that novel coronavirus (COVID-19) may be circulating in the community. If you develop symptoms such as fever, cough, or SOB or if you have concerns about the presence of another infection including coronavirus (COVID-19), please contact your health care provider or visit [email or web address].     Disposition/Instructions    Home care recommended. Follow home care protocol based instructions.    Thank you for taking steps to prevent the spread of this virus.  o Limit your contact with others.  o Wear a simple mask to cover your cough.  o Wash your hands well and often.    Resources    [facility]: About COVID-19: [email or web address]    CDC: What to Do If You're Sick: www.cdc.gov/coronavirus/2019-ncov/about/steps-when-sick.html    CDC: Ending Home Isolation: www.cdc.gov/coronavirus/2019-ncov/hcp/disposition-in-home-patients.html     CDC: Caring for Someone: www.cdc.gov/coronavirus/2019-ncov/if-you-are-sick/care-for-someone.html     [facility]: Interim Guidance for Hospital Discharge to Home: www.health.[facility].[place].us/diseases/coronavirus/hcp/hospdischarge.pdf    [facility] clinical trials (COVID-19 research studies): [email or web address]     Below are the COVID-19 hotlines at the [facility] of Health ([facility]). Interpreters are available.   o For health questions: Call 805-633-7569 or 1-209.388.7608 (7 a.m. to 7 p.m.)  o For questions about schools and childcare: Call 880-550-1585 or  8-468-639-9343 (7 a.m. to 7 p.m.)       Reason for Disposition    Systolic BP >= 180 OR Diastolic >= 110    Additional Information    Negative: Sounds like a life-threatening emergency to the triager    Negative: Pregnant > 20 weeks or postpartum (< 6 weeks after delivery) and new hand or face swelling    Negative: Pregnant > 20 weeks and BP > 140/90    Negative: Systolic BP >= 160 OR Diastolic >= 100, and any cardiac or neurologic symptoms (e.g., chest pain, difficulty breathing, unsteady gait, blurred vision)    Negative: Patient sounds very sick or weak to the triager    Negative: BP Systolic BP >= 140 OR Diastolic >= 90 and postpartum (from 0 to 6 weeks after delivery)    Negative: Systolic BP >= 180 OR Diastolic >= 110, and missed most recent dose of blood pressure medication    Protocols used: HIGH BLOOD PRESSURE-A-OH    Maricruz PATTERSON RN FNA

## 2021-06-13 NOTE — PATIENT INSTRUCTIONS - HE
Return in about 1 day (around 11/25/2020) for asap for headache, 1 month for htn and 3 months for diabetes.

## 2021-06-13 NOTE — TELEPHONE ENCOUNTER
Medication Question or Clarification  Who is calling: Patient  What medication are you calling about (include dose and sig)?:   losartan (COZAAR) 100 MG tablet   4/17/2018     Sig - Route: Take 100 mg by mouth daily.  - Oral    Class: Historical Med        Who prescribed the medication?: HIstorical  What is your question/concern?: When patient was seen yesterday she did not take home with her the summary.  Patient is asking if she is to continue with this medication as well as the new prescription she received yesterday for hydrochlorothiazide.  Please clarify.  Requested Pharmacy: [facility] to leave a detailed message?: Yes

## 2021-06-13 NOTE — TELEPHONE ENCOUNTER
We decided to discontinue the Cozaar because we thought it might be causing her chronic cough.  Also her blood pressure was really quite low yesterday and so we decided to just see how her blood pressure did without that medication and follow-up in 1 month at which time we can determine if the cough resolved and where her blood pressure landed and whether she needs additional medications.    So she should not continue the Cozaar.

## 2021-06-13 NOTE — PROGRESS NOTES
ASSESSMENT AND PLAN:    We spent 40 minutes today in direct patient contact, greater than 50% of the time in consultation concerning medical problems as listed below.     Problem List Items Addressed This Visit        Unprioritized    Arthralgias In Multiple Sites    Relevant Medications    DULoxetine (CYMBALTA) 20 MG capsule    gabapentin (NEURONTIN) 300 MG capsule    Other Relevant Orders    Ambulatory referral to Rheumatology    Rheumatoid arthritis (H)     Has seen a rheumatologist in [place] most recently.  Will refer back to rheumatology here - saw Dr. Wagoner in the past.     Says she has been on methotrexate 2.5mg dose I do not prescribe this so referral to rheum placed.     She also takes gabapentin 600mg po q hs for pain, that is refilled today.     cymbalta 20mg po q day prescribed for pain. Refilled.          Hypertension     BP Readings from Last 3 Encounters:   11/24/20 100/60   05/24/18 110/62   05/17/18 100/62      discharge Cozaar 100mg po q day - need to discontinue as it may be causing a cough.    Increase hydrochlorothiazide 12.5 mg to 25 mg po q day.    Because of cough we need to discontinue cozaar - will increase hydrochlorothiazide and have her in for bp check in 1 month.          Relevant Medications    hydroCHLOROthiazide (HYDRODIURIL) 25 MG tablet    Undiagnosed cardiac murmurs     She has not been seen by cardiology in over 5 years.  Needs reevaluation and updated recommendation. Cardiology consult ordered.          Relevant Orders    Ambulatory referral to Cardiology    Gastroesophageal reflux disease with esophagitis     She takes omeprazole 20 m po q am - works well for gerd. Had egd in the past and she says it was benign.         Relevant Medications    omeprazole (PRILOSEC) 20 MG capsule    RESOLVED: Type 2 diabetes mellitus with complication, without long-term current use of insulin (H) - Primary    Relevant Medications    SITagliptin (JANUVIA) 100 MG tablet    dapagliflozin  (FARXIGA) 5 mg Tab    repaglinide (PRANDIN) 2 MG tablet    Other Relevant Orders    Glycosylated Hemoglobin A1c (Completed)    RESOLVED: Dysphagia     Resolved per patient, she says this only happens very rarely.  egd done and no blockage seen           Other Visit Diagnoses     Encounter for therapeutic drug monitoring        Relevant Orders    Comprehensive Metabolic Panel    Lipid [place] FASTING    HM1(CBC and Differential) (Completed)    Encounter for screening mammogram for malignant neoplasm of breast        Relevant Orders    Mammo Screening Bilateral    Mixed hyperlipidemia        Relevant Medications    atorvastatin (LIPITOR) 20 MG tablet           Chief Complaint   Patient presents with     Diabetes     cough/ brain feels like its expandin?        HPI  Lizabeth Callahan is a 65 y.o. female comes in with several medication requests and concerns after being absent for over two years.  She has  complex medical issues with numerous medications.      She wants refill of gabapentin and cymbalta for her arthritis pains.  When asked where she has arthritis she says hands, feet, knees hips, all over really.  She used to see a rheumatologist but then was in [place] on and off so would see rhem in [place] too. She does want to see rheumatology to discuss and get refills of methotrexate.    She complains of a chronic cough, she feels it might be a medication side effect.     She also wants refill omeprazole which she has been using chronically and successfully for gerd.    She also has been having a headache. Because of all the above issues we did not really get time to discuss headache.      Social History     Tobacco Use   Smoking Status Never Smoker   Smokeless Tobacco Never Used      Current Outpatient Medications on File Prior to Visit   Medication Sig Dispense Refill     aspirin 81 MG EC tablet Take 81 mg by mouth daily.       blood sugar diagnostic (GLUCOSE BLOOD) Strp Patient Test One Time Daily-UNISTRIP TEST  STRIPS AND LANCETS. DX: Type 2  strip 3     cinnamon bark (CINNAMON) 500 mg capsule Take 500 mg by mouth daily.       losartan (COZAAR) 100 MG tablet Take 100 mg by mouth daily.        methotrexate 2.5 MG tablet Take 17.5 mg by mouth once a week.        nystatin (MYCOSTATIN) powder Apply to affected area 3 times a day as needed. 15 g 3     therapeutic multivitamin (THERAGRAN) tablet Take 1 tablet by mouth daily.       triamcinolone (KENALOG) 0.5 % cream Apply 1 application topically 3 (three) times a day as needed.        UNISTRIP1 TEST STRIP Strp TEST BLOOD SUGARS ONE TIME DAILY 1 strip PRN     [DISCONTINUED] atorvastatin (LIPITOR) 20 MG tablet Take 20 mg by mouth daily.        [DISCONTINUED] DULoxetine (CYMBALTA) 20 MG capsule Take 20 mg by mouth at bedtime.        [DISCONTINUED] gabapentin (NEURONTIN) 300 MG capsule Take 600 mg by mouth at bedtime.        [DISCONTINUED] hydroCHLOROthiazide (HYDRODIURIL) 12.5 MG tablet Take 12.5 mg by mouth daily.        [DISCONTINUED] omeprazole (PRILOSEC) 20 MG capsule Take 20 mg by mouth daily before breakfast.        [DISCONTINUED] repaglinide (PRANDIN) 2 MG tablet Take 1 mg by mouth 2 (two) times a day before meals.        multivit with minerals/lutein (MULTIVITAMIN 50 PLUS ORAL) multivitamin   once daily       [DISCONTINUED] lactobacillus acidophilus (ACIDOPHILUS) cap Take 1 capsule by mouth daily.        [DISCONTINUED] warfarin (COUMADIN) 4 MG tablet Take 4mg orally on Monday, Wednesday and Friday. 30 tablet 0     [DISCONTINUED] warfarin (COUMADIN) 5 MG tablet Take one tab daily on Tuesday, Thursday, Saturday and Sunday. 30 tablet 0     No current facility-administered medications on file prior to visit.       Allergies   Allergen Reactions     Apple Anaphylaxis     Red delicious apple     Hazelnut Anaphylaxis     Hazelnuts Anaphylaxis     Amoxicillin Nausea Only     Codeine Nausea Only     Hydrocodone-Acetaminophen Other (See Comments)     Low blood pressure      Sulfa (Sulfonamide Antibiotics)        Review of Systems   Constitutional: Negative.    HENT: Negative.    Eyes: Negative.    Respiratory: Negative.    Cardiovascular: Negative.    Gastrointestinal: Negative.    Endocrine: Negative.    Genitourinary: Negative.    Musculoskeletal: Negative.    Skin: Negative.    Neurological: Negative.    Hematological: Negative.    Psychiatric/Behavioral: Negative.         OBJECTIVE: /60 (Patient Site: Left Arm, Patient Position: Sitting, Cuff Size: Adult Large)   Pulse 79   Wt 177 lb (80.3 kg)   SpO2 92%   BMI 31.35 kg/m     Physical Exam  Constitutional:       General: She is not in acute distress.     Appearance: She is well-developed.   HENT:      Head: Normocephalic and atraumatic.   Eyes:      Conjunctiva/sclera: Conjunctivae normal.   Neck:      Musculoskeletal: Neck supple.   Cardiovascular:      Rate and Rhythm: Normal rate and regular rhythm.      Heart sounds: Normal heart sounds.   Pulmonary:      Effort: Pulmonary effort is normal.      Breath sounds: Normal breath sounds.   Abdominal:      Palpations: Abdomen is soft.   Musculoskeletal: Normal range of motion.   Skin:     General: Skin is warm and dry.   Neurological:      Mental Status: She is alert and oriented to person, place, and time.        Diabetic foot exam: normal DP and PT pulses, no trophic changes or ulcerative lesions and normal sensory exam       Additional History from Old Records Summarized (2): yes  Decision to Obtain Records (1): yes  Radiology Tests Summarized or Ordered (1): yes  Labs Reviewed or Ordered (1): yes  Medicine Test Summarized or Ordered (1): yes  Independent Review of EKG or X-RAY(2 each): no    This note was created using Dragon dictation.  Please excuse any grammatical errors.

## 2021-06-14 NOTE — PROGRESS NOTES
ASSESSMENT AND PLAN:      Problem List Items Addressed This Visit        Unprioritized    Dyslipidemia, goal LDL below 100     Ordered today, plan time to review statin decision aid at follow up          Arthralgias In Multiple Sites     She is waiting to make appt w rheum as they do everything virtually and she wasn't sure she should do it virtually, but now decided to go ahead.     Managed by Dr. Wagoner. She is planning to schedule appt w his team.            Hypertension - Primary     BP Readings from Last 3 Encounters:   12/23/20 124/80   12/08/20 140/90   11/24/20 100/60      Hydrochlorothiazide 25 mg po q day  Losartan 100mg po q day  prandin 2mg three times a day.  Due to the med changes will check bmp again.   Follow up in 6 months         Relevant Orders    Basic Metabolic Panel (Completed)    Undiagnosed cardiac murmurs     Saw cardiology 12/8/2020 - notes reviewed.   Follow up planned in 2 months (2/8/2020 or so)           Other Visit Diagnoses     Lipid screening        Relevant Orders    Lipid [place] FASTING (Completed)           Chief Complaint   Patient presents with     Blood Pressure Check        HPI  Lizabeth Callahan is a 65 y.o. female comes in for bp check and general follow up.     Social History     Tobacco Use   Smoking Status Never Smoker   Smokeless Tobacco Never Used      Current Outpatient Medications on File Prior to Visit   Medication Sig Dispense Refill     aspirin 81 MG EC tablet Take 81 mg by mouth daily.       atorvastatin (LIPITOR) 20 MG tablet Take 1 tablet (20 mg total) by mouth daily. 90 tablet 0     blood sugar diagnostic (GLUCOSE BLOOD) Strp Patient Test One Time Daily-UNISTRIP TEST STRIPS AND LANCETS. DX: Type 2  strip 3     cinnamon bark (CINNAMON) 500 mg capsule Take 500 mg by mouth daily.       dapagliflozin (FARXIGA) 5 mg Tab Take 5 mg by mouth daily. 90 tablet 0     DULoxetine (CYMBALTA) 20 MG capsule Take 1 capsule (20 mg total) by mouth at bedtime. 90 capsule 3      gabapentin (NEURONTIN) 300 MG capsule Take 2 capsules (600 mg total) by mouth at bedtime. 90 capsule 3     hydroCHLOROthiazide (HYDRODIURIL) 25 MG tablet Take 1 tablet (25 mg total) by mouth daily. 30 tablet 0     losartan (COZAAR) 100 MG tablet Take 100 mg by mouth daily.        metFORMIN (GLUCOPHAGE) 500 MG tablet Take 500 mg by mouth.       methotrexate 2.5 MG tablet Take 17.5 mg by mouth once a week.        multivit with minerals/lutein (MULTIVITAMIN 50 PLUS ORAL) multivitamin   once daily       nystatin (MYCOSTATIN) powder Apply to affected area 3 times a day as needed. 15 g 3     omeprazole (PRILOSEC) 20 MG capsule Take 1 capsule (20 mg total) by mouth daily before breakfast. 90 capsule 3     repaglinide (PRANDIN) 2 MG tablet Take 1 tablet (2 mg total) by mouth 3 (three) times a day before meals. 270 tablet 0     SITagliptin (JANUVIA) 100 MG tablet Take 1 tablet (100 mg total) by mouth daily. With or without food 90 tablet 3     therapeutic multivitamin (THERAGRAN) tablet Take 1 tablet by mouth daily.       triamcinolone (KENALOG) 0.5 % cream Apply 1 application topically 3 (three) times a day as needed.        UNISTRIP1 TEST STRIP Strp TEST BLOOD SUGARS ONE TIME DAILY 1 strip PRN     No current facility-administered medications on file prior to visit.       Allergies   Allergen Reactions     Apple Anaphylaxis     Red delicious apple     Hazelnut Anaphylaxis     Hazelnuts Anaphylaxis     Niacin Hives     Amoxicillin Nausea Only     Codeine Nausea Only     Hydrocodone-Acetaminophen Other (See Comments)     Low blood pressure     Sulfa (Sulfonamide Antibiotics)        Review of Systems   Constitutional: Negative.    HENT: Negative.    Eyes: Negative.    Respiratory: Negative.    Cardiovascular: Negative.    Gastrointestinal: Negative.    Endocrine: Negative.    Genitourinary: Negative.    Musculoskeletal: Negative.    Skin: Negative.    Neurological: Negative.    Hematological: Negative.     Psychiatric/Behavioral: Negative.         OBJECTIVE: /80   Pulse 80   Resp 16   Wt 178 lb (80.7 kg)   BMI 31.53 kg/m     Physical Exam  Constitutional:       General: She is not in acute distress.     Appearance: She is well-developed.   HENT:      Head: Normocephalic and atraumatic.   Eyes:      Conjunctiva/sclera: Conjunctivae normal.   Neck:      Musculoskeletal: Neck supple.   Cardiovascular:      Rate and Rhythm: Normal rate and regular rhythm.   Pulmonary:      Effort: Pulmonary effort is normal.   Musculoskeletal: Normal range of motion.   Skin:     General: Skin is warm and dry.   Neurological:      Mental Status: She is alert and oriented to person, place, and time.          Chart reviewed:   11/24/20 cbc ess nl, a1c down from 7.4 to 7.2. cmp shows elevated glucose (expected -diabetic), bun and gfr mildly abnormal, but creatinine was normal.  12/11/20     This note was created using Dragon dictation.  Please excuse any grammatical errors.

## 2021-06-14 NOTE — PROGRESS NOTES
----- Message -----  From: Mark Shaver MD  Sent: 1/15/2021  11:24 AM CST  To: China Charles RN    Please call the patient know that I have reviewed her stress cardiac MRI findings.      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    I would like to have exercise treadmill echo test (not for ischemia) to evaluate mitral valve gradients and tricuspid valve gradient to see if it is related to her dyspnea on exertion.    Thanks  Parrish    ----------------------------------------------------------------------------------------------  Exercise stress echo ordered.  -nasima

## 2021-06-15 NOTE — TELEPHONE ENCOUNTER
Reason for Call:  Medication or medication refill:  hydroCHLOROthiazide (HYDRODIURIL) 25 MG tablet 30 tablet     atorvastatin (LIPITOR) 20 MG tablet 90 tablet           Do you use a [facility]?  Name of the pharmacy and phone number for the current request: [facility]     Name of the medication requested:    hydroCHLOROthiazide (HYDRODIURIL) 25 MG tablet 30 tablet     atorvastatin (LIPITOR) 20 MG tablet 90 tablet         Other request:   Can we leave a detailed message on this number? Yes    Phone number patient can be reached at:   Cell number on file:    Telephone Information:   Mobile [phone]       Best Time:any    Call taken on 2/10/2021 at 9:07 AM by Danita Nunez

## 2021-06-15 NOTE — TELEPHONE ENCOUNTER
Reason for Call:  Other prescription      Detailed comments: Patient would like a refill on nystatin powder     [facility] [place] - [address]    Phone Number Patient can be reached at: [phone]     Best Time: anytime     Can we leave a detailed message on this number?: Yes

## 2021-06-15 NOTE — TELEPHONE ENCOUNTER
Reason for Call:  Medication or medication refill:    dapagliflozin (FARXIGA) 5 mg Tab    Do you use a [facility]?  Name of the pharmacy and phone number for the current request: [facility] [place]      Name of the medication requested: dapagliflozin (FARXIGA) 5 mg Tab    Other request: Pt is already out of the medication    Can we leave a detailed message on this number? No call back needed    Phone number patient can be reached at: Home number on file [phone] (home)    Best Time:     Call taken on 3/1/2021 at 11:31 AM by Pretty Hernandez

## 2021-06-15 NOTE — TELEPHONE ENCOUNTER
Problem List Items Addressed This Visit        Unprioritized    Dyslipidemia, goal LDL below 100     Lab Results   Component Value Date    CHOL 173 12/23/2020    CHOL 130 04/06/2015    CHOL 149 06/16/2014     Lab Results   Component Value Date    HDL 46 (L) 12/23/2020    HDL 53 04/06/2015    HDL 48 06/16/2014     Lab Results   Component Value Date    LDLCALC 97 12/23/2020    LDLCALC 62 04/06/2015    LDLCALC 81 06/16/2014     Lab Results   Component Value Date    TRIG 148 12/23/2020    TRIG 76 04/06/2015    TRIG 100 06/16/2014     No components found for: CHOLHDL     Refilled lipitor 20 mg po q day.          Relevant Medications    atorvastatin (LIPITOR) 20 MG tablet    Hypertension     BP Readings from Last 3 Encounters:   01/14/21 117/61   12/23/20 124/80   12/08/20 140/90      Hydrochlorothiazide 25 mg po q day refilled.   Losartan 100 mg po q day         Relevant Medications    hydroCHLOROthiazide (HYDRODIURIL) 25 MG tablet      Other Visit Diagnoses     Mixed hyperlipidemia        Relevant Medications    atorvastatin (LIPITOR) 20 MG tablet

## 2021-06-15 NOTE — TELEPHONE ENCOUNTER
RN cannot approve Refill Request    RN can NOT refill this medication med is not covered by policy/route to provider. Last office visit: 12/23/2020 Suzy Gabriel MD Last Physical: Visit date not found Last MTM visit: Visit date not found Last visit same specialty: 12/23/2020 Suzy Gabriel MD.  Next visit within 3 mo: Visit date not found  Next physical within 3 mo: Visit date not found      [facility], [facility] Triage/Med Refill 3/1/2021    Requested Prescriptions   Pending Prescriptions Disp Refills     FARXIGA 5 mg Tab [Pharmacy Med Name: DAPAGLIFLOZIN  5MG TAB] 90 tablet 0     Sig: TAKE ONE TABLET BY MOUTH EVERY DAY       There is no refill protocol information for this order

## 2021-06-16 PROBLEM — Z86.711 HX PULMONARY EMBOLISM: Status: ACTIVE | Noted: 2018-05-09

## 2021-06-16 PROBLEM — Z51.81 ANTICOAGULATION GOAL OF INR 2 TO 3: Status: ACTIVE | Noted: 2018-05-17

## 2021-06-16 PROBLEM — N13.30 HYDRONEPHROSIS: Status: ACTIVE | Noted: 2017-12-15

## 2021-06-16 PROBLEM — K21.00 GASTROESOPHAGEAL REFLUX DISEASE WITH ESOPHAGITIS: Status: ACTIVE | Noted: 2020-11-24

## 2021-06-16 PROBLEM — R51.9 HEADACHE: Status: ACTIVE | Noted: 2020-11-24

## 2021-06-16 PROBLEM — Z79.01 ANTICOAGULATION GOAL OF INR 2 TO 3: Status: ACTIVE | Noted: 2018-05-17

## 2021-06-16 PROBLEM — R93.2 ABNORMAL FINDINGS ON DIAGNOSTIC IMAGING OF LIVER AND BILIARY TRACT: Status: ACTIVE | Noted: 2017-12-15

## 2021-06-16 NOTE — TELEPHONE ENCOUNTER
Reason for Call:  Medication or medication refill:  losartan (COZAAR) 100 MG tablet   4/17/2018  --   Sig - Route: Take 100 mg by mouth daily         Do you use a [facility]?  Name of the pharmacy and phone number for the current request:[facility]     Name of the medication requested:   losartan (COZAAR) 100 MG tablet   4/17/2018  --   Sig - Route: Take 100 mg by mouth daily         Other request: would like a 90 supply if possible.   Can we leave a detailed message on this number? Yes    Phone number patient can be reached at:   Cell number on file:    Telephone Information:   Mobile [phone]       Best Time: any    Call taken on 3/29/2021 at 8:47 AM by Danita Nunez

## 2021-06-17 NOTE — PROGRESS NOTES
ASSESSMENT AND PLAN:     Problem List Items Addressed This Visit        High    Shortness of breath     Highly suspicious of left leg DVT as well as pulmonary embolism.  Likely onset of the DVT is 4/23/2018.  Likely onset of the pulmonary embolism was 4/26/2018.  There is positive history of a plane ride to [place] before this started and she was able to do everything she wanted to in [place] but did note leg pain and swelling in the left leg as well as shortness of breath fairly constantly throughout her trip and continuing until she has now returned here to [place].    Recommend: Ambulance to emergency room.  They declined this and plan to drive straight to [facility] right now.  I did call over to [facility] and talk to a physician's assistant named Pam who understands that they will be coming over.  Plan for left lower leg ultrasound and CT PE run.    EKG was done today and there are some EKG changes noted from previous EKG done in 2015 these may or may not be related to a pulmonary embolism.  The patient is aware of this and still declined ambulance today               Other Visit Diagnoses     Visit for screening mammogram    -  Primary    Screen for colon cancer        SOB (shortness of breath)        Relevant Orders    Electrocardiogram Perform - Clinic (Completed)    Leg swelling               Chief Complaint   Patient presents with     Leg Pain     Patient states she has been having pain and swelling in her left leg - calf area. Also has some pain in her upper hip area as well.         JADEN  Lizabeth Callahan is a 63 y.o. female who is new to me today and has not been seen in [place] since 2015, complains of chronic swelling left leg for the past year and new pain in the left lower leg without trauma since 4/23/2018 she flew to [place] on 4/22/2018.  Then on 4/26/2018 she was at about 700 feet elevation and started to notice some shortness of breath with that did not resolve when she came down from  elevation.  She is continued to have the left leg swelling, pain and shortness of breath since 4/26/2018.  She tells me that the pain gets worse when she flexes her left foot.  She is also noticed some coughing along with the shortness of breath intermittently.    DVT/PE risk factors: She was on a plane going to [place] [employer] around the time of onset.  She has a history of a DVT in the left leg in 1972 when she was on oral contraceptive pills.  She is not currently on hormone replacement therapy, has no history of malignancy and does not smoke.    She says she actually feels pretty well right now except for that left leg pain and she knows that she is got a little bit of shortness of breath although it is not super bothersome at the moment    They actually live in [facility] [place] and are newly moved back to [place].  She is here with her  Ryan.     History   Smoking Status     Never Smoker   Smokeless Tobacco     Never Used      Current Outpatient Prescriptions on File Prior to Visit   Medication Sig Dispense Refill     atorvastatin (LIPITOR) 40 MG tablet TAKE ONE TABLET BY MOUTH ONCE DAILY (DUE  FOR  ANNUAL  EXAM  PRIOR  TO  NEXT  REFILL  PLEASE  SCHEDULE) 30 tablet 0     blood sugar diagnostic (GLUCOSE BLOOD) Strp Patient Test One Time Daily-UNISTRIP TEST STRIPS AND LANCETS. DX: Type 2  strip 3     BLOOD-GLUCOSE METER,MOBILE DEV MISC Test one time daily       cinnamon bark (CINNAMON) 500 mg capsule Take 500 mg by mouth daily.       lactobacillus acidophilus (ACIDOPHILUS) cap Take 1 capsule by mouth.       losartan (COZAAR) 50 MG tablet Take 25 mg by mouth daily. TAKE ONE TABLET BY MOUTH EVERY DAY       nystatin (MYCOSTATIN) powder Apply to affected area 3 times a day as needed. 15 g 3     omeprazole (PRILOSEC) 20 MG capsule Take 20 mg by mouth daily.       therapeutic multivitamin (THERAGRAN) tablet Take 1 tablet by mouth daily.       triamcinolone (KENALOG) 0.5 % cream Apply topically 3 (three)  "times a day.       UNISTRIP1 TEST STRIP Strp TEST BLOOD SUGARS ONE TIME DAILY 1 strip PRN     metFORMIN (GLUCOPHAGE) 500 MG tablet TAKE ONE TABLET BY MOUTH TWICE DAILY WITH MEALS 180 tablet 0     methotrexate 2.5 MG tablet Take 8 tablets (20 mg total) by mouth once a week. 64 tablet 0     No current facility-administered medications on file prior to visit.       Allergies   Allergen Reactions     Amoxicillin      Codeine      Hydrocodone-Acetaminophen      Sulfa (Sulfonamide Antibiotics)          Review of Systems   Constitutional: Negative.    HENT: Negative.    Eyes: Negative.    Respiratory: Positive for cough (for the past two week.) and shortness of breath.    Cardiovascular: Positive for leg swelling (left leg swelling and pain.).   Gastrointestinal: Negative.    Endocrine: Negative.    Genitourinary: Negative.    Musculoskeletal: Negative.    Skin: Negative.    Neurological: Negative.    Hematological: Negative.    Psychiatric/Behavioral: Negative.         OBJECTIVE: /82  Pulse 68  Resp 14  Ht 5' 3\" (1.6 m)  Wt 178 lb (80.7 kg)  Breastfeeding? No  BMI 31.53 kg/m2   Physical Exam   Constitutional: She is oriented to person, place, and time. She appears well-developed and well-nourished. No distress.   HENT:   Head: Normocephalic and atraumatic.   Eyes: Conjunctivae are normal.   Neck: Neck supple.   Cardiovascular: Normal rate, regular rhythm and normal heart sounds.    Pulmonary/Chest: Effort normal and breath sounds normal. No respiratory distress.   No increased work of breathing   Musculoskeletal: Normal range of motion.   Neurological: She is alert and oriented to person, place, and time.   Skin: Skin is warm and dry.   Psychiatric: She has a normal mood and affect.      ekg - new changes suspicious for age indeterminate infarct.     This note was created using Dragon dictation.  Please excuse any grammatical errors.    "

## 2021-06-18 NOTE — PROGRESS NOTES
SUBJECTIVE: Lizabeth Callahan is a 63 y.o. female comes in for hospital follow up.  She says that she is doing well and she is very pleased that her left leg is finally no longer swollen.  She says she has had left leg swelling for the past 1 year nearly constantly.  He had thought that this was a chronic condition but now that it has resolved she is wondering if maybe she has just had a blood clot for this entire time.    DATE OF ADMISSION: 5/9/2018  DATE OF DISCHARGE: 5/11/2018  DISCHARGE DIAGNOSIS: Acute PE and acute DVT of the left leg  BRIEF HOSPITAL COURSE: Was noted to have left leg swelling and shortness of breath after a trip to [place].  She was found to have a pulmonary embolism likely secondary to a left leg DVT.  She was placed on anticoagulation therapy.  IMPORTANT PENDING RESULTS: none, but needs inr anticoag therapy managed.     Patient Active Problem List   Diagnosis     Right Trapezoid Muscle Strain     Osteoarthritis Of The Knee     Seborrheic Keratosis     Type 2 Diabetes Mellitus - Uncomplicated, Controlled     Hyperlipidemia     Hypertension     Arthralgias In Multiple Sites     Myalgia And Myositis     Rheumatoid Arthritis     Generalized Osteoarthritis Of The Hand     Localized Primary Osteoarthritis Of The Carpometacarpal Joint     Calcaneal Spur     Foot Pain (Soft Tissue)     Pain During Urination (Dysuria)     Joint Pain, Localized In The Knee     Acute Sinusitis     Dysuria     UTI (lower urinary tract infection)     Rheumatoid arthritis     Shortness of breath     Pulmonary embolism     Pulmonary emboli     Acute deep vein thrombosis (DVT) of proximal vein of left lower extremity     Chest pain, unspecified type     Type 2 diabetes mellitus with complication, without long-term current use of insulin     Accelerated hypertension     Left leg pain     Leg edema, left     Past Medical History:   Diagnosis Date     Hyperlipemia      Hypertension      Myalgia      Osteoarthritis      Rheumatoid  arthritis      Seborrheic keratoses      Type 2 diabetes mellitus      Current Outpatient Prescriptions   Medication Sig Dispense Refill     atorvastatin (LIPITOR) 20 MG tablet Take 20 mg by mouth daily.        blood sugar diagnostic (GLUCOSE BLOOD) Strp Patient Test One Time Daily-UNISTRIP TEST STRIPS AND LANCETS. DX: Type 2  strip 3     cinnamon bark (CINNAMON) 500 mg capsule Take 500 mg by mouth daily.       DULoxetine (CYMBALTA) 20 MG capsule Take 20 mg by mouth at bedtime.        enoxaparin (LOVENOX) 80 mg/0.8 mL syringe Inject 0.8 mL (80 mg total) under the skin every 12 (twelve) hours for 4 days. 8 Syringe 0     gabapentin (NEURONTIN) 300 MG capsule Take 600 mg by mouth at bedtime.        hydroCHLOROthiazide (HYDRODIURIL) 12.5 MG tablet Take 12.5 mg by mouth daily.        lactobacillus acidophilus (ACIDOPHILUS) cap Take 1 capsule by mouth daily.        losartan (COZAAR) 100 MG tablet Take 100 mg by mouth daily.        methotrexate 2.5 MG tablet Take 17.5 mg by mouth once a week.        nystatin (MYCOSTATIN) powder Apply to affected area 3 times a day as needed. 15 g 3     omeprazole (PRILOSEC) 20 MG capsule Take 20 mg by mouth daily before breakfast.        repaglinide (PRANDIN) 2 MG tablet Take 1 mg by mouth 2 (two) times a day before meals.        therapeutic multivitamin (THERAGRAN) tablet Take 1 tablet by mouth daily.       triamcinolone (KENALOG) 0.5 % cream Apply 1 application topically 3 (three) times a day as needed.        UNISTRIP1 TEST STRIP Strp TEST BLOOD SUGARS ONE TIME DAILY 1 strip PRN     warfarin (COUMADIN) 5 MG tablet Take one tab daily till repeat INR on Monday 120 tablet 0     No current facility-administered medications for this visit.      Allergies   Allergen Reactions     Apple Anaphylaxis     Red delicious apple     Hazelnuts Anaphylaxis     Amoxicillin Nausea Only     Codeine Nausea Only     Hydrocodone-Acetaminophen Other (See Comments)     Low blood pressure     Sulfa  "(Sulfonamide Antibiotics)          OBJECTIVE: /62 (Patient Site: Left Arm, Patient Position: Sitting, Cuff Size: Adult Large)  Pulse 68  Ht 5' 3\" (1.6 m)  Wt 174 lb (78.9 kg)  BMI 30.82 kg/m2  Physical Exam   Constitutional: She is oriented to person, place, and time. She appears well-developed and well-nourished. No distress.   HENT:   Head: Normocephalic and atraumatic.   Eyes: Conjunctivae are normal.   Neck: Neck supple.   Cardiovascular: Normal rate and regular rhythm.    Pulmonary/Chest: Effort normal.   Musculoskeletal: Normal range of motion.   Legs were examined in both legs reveal no pedal edema.  In fact she says that her legs have not looked this normal in a year.  Her left leg has been chronically swollen for 1 year.   Neurological: She is alert and oriented to person, place, and time.   Skin: Skin is warm and dry.   Psychiatric: She has a normal mood and affect.        ASSESSMENT/ PLAN:  HOSPITAL FOLLOW UP DVT/PULMONARY EMBOLISM- needs anticoagulation therapy.  Lab Results   Component Value Date    INR 2.40 (H) 05/17/2018    INR 1.40 (H) 05/14/2018    INR 1.14 (H) 05/11/2018       Currently taking lovenox and coumadin.  Coumadin dose has been 5mg po q day for the past week.   Since she is therapeutic today will continue coumadin 5mg po q day  Discontinue lovenox  Follow up inr in one week and titrate if needed, but hopefully it will be stable.   She plans to return home to [place] 5/28/2018.    The fact that her leg on the left has been swollen for the past 1 year and since she started anticoagulation therapy has resolved makes me wonder if she has been having recurrent blood clots in that left leg over the past year.  Additionally,  she had a clot back in 1987 which was thought to be due to oral contraceptive pills.  This would make me lean towards chronic anticoagulation therapy.  I will defer to her primary physician who knows her better..  "

## 2021-06-18 NOTE — PROGRESS NOTES
ASSESSMENT AND PLAN:     Problem List Items Addressed This Visit        Unprioritized    Pulmonary embolism    Pulmonary emboli    Relevant Medications    warfarin (COUMADIN) 5 MG tablet    Anticoagulation goal of INR 2 to 3     Lab Results   Component Value Date    INR 3.50 (H) 05/24/2018    INR 2.40 (H) 05/17/2018    INR 1.40 (H) 05/14/2018      She has been taking Coumadin 5 mg orally per day.  Because the goal INR is between 2 and 3 and she is at 3.5 today I will decrease her dose.    Start Coumadin 4 mg orally per day on Monday Wednesday and Friday.  Start Coumadin 5 mg orally per day on Tuesday, Thursday, Saturday and Sunday.    Recheck INR on 5/30/2018 or 5/31/2018 when she is back at home in [place].                Chief Complaint   Patient presents with     Follow-up        HPI  Lizabeth Callahan is a 63 y.o. female comes in to follow up on coumadin titration and needs inr today.     She takes Coumadin 5mg / day.  last inr was 2.4     then she goes to [place] 5/28/2018 (which is her home)    History   Smoking Status     Never Smoker   Smokeless Tobacco     Never Used      Current Outpatient Prescriptions on File Prior to Visit   Medication Sig Dispense Refill     atorvastatin (LIPITOR) 20 MG tablet Take 20 mg by mouth daily.        blood sugar diagnostic (GLUCOSE BLOOD) Strp Patient Test One Time Daily-UNISTRIP TEST STRIPS AND LANCETS. DX: Type 2  strip 3     cinnamon bark (CINNAMON) 500 mg capsule Take 500 mg by mouth daily.       DULoxetine (CYMBALTA) 20 MG capsule Take 20 mg by mouth at bedtime.        gabapentin (NEURONTIN) 300 MG capsule Take 600 mg by mouth at bedtime.        hydroCHLOROthiazide (HYDRODIURIL) 12.5 MG tablet Take 12.5 mg by mouth daily.        lactobacillus acidophilus (ACIDOPHILUS) cap Take 1 capsule by mouth daily.        losartan (COZAAR) 100 MG tablet Take 100 mg by mouth daily.        methotrexate 2.5 MG tablet Take 17.5 mg by mouth once a week.        nystatin  (MYCOSTATIN) powder Apply to affected area 3 times a day as needed. 15 g 3     omeprazole (PRILOSEC) 20 MG capsule Take 20 mg by mouth daily before breakfast.        repaglinide (PRANDIN) 2 MG tablet Take 1 mg by mouth 2 (two) times a day before meals.        therapeutic multivitamin (THERAGRAN) tablet Take 1 tablet by mouth daily.       triamcinolone (KENALOG) 0.5 % cream Apply 1 application topically 3 (three) times a day as needed.        UNISTRIP1 TEST STRIP Strp TEST BLOOD SUGARS ONE TIME DAILY 1 strip PRN     [DISCONTINUED] warfarin (COUMADIN) 5 MG tablet Take one tab daily till repeat INR on Monday 120 tablet 0     No current facility-administered medications on file prior to visit.       Allergies   Allergen Reactions     Apple Anaphylaxis     Red delicious apple     Hazelnuts Anaphylaxis     Amoxicillin Nausea Only     Codeine Nausea Only     Hydrocodone-Acetaminophen Other (See Comments)     Low blood pressure     Sulfa (Sulfonamide Antibiotics)          Review of Systems   Constitutional: Negative.    HENT: Negative.    Eyes: Negative.    Respiratory: Negative.    Cardiovascular: Negative.    Gastrointestinal: Negative.    Endocrine: Negative.    Genitourinary: Negative.    Musculoskeletal: Negative.    Skin: Negative.    Neurological: Negative.    Hematological: Negative.    Psychiatric/Behavioral: Negative.         OBJECTIVE: /62 (Patient Site: Left Arm, Patient Position: Sitting, Cuff Size: Adult Regular)  Wt 179 lb (81.2 kg)  BMI 31.71 kg/m2     Physical Exam   Constitutional: She is oriented to person, place, and time. She appears well-developed and well-nourished. No distress.   HENT:   Head: Normocephalic and atraumatic.   Eyes: Conjunctivae are normal.   Neck: Neck supple.   Cardiovascular: Normal rate and regular rhythm.    Pulmonary/Chest: Effort normal.   Musculoskeletal: Normal range of motion.   Neurological: She is alert and oriented to person, place, and time.   Skin: Skin is warm  and dry.   Psychiatric: She has a normal mood and affect.      Lab Results   Component Value Date    INR 3.50 (H) 05/24/2018    INR 2.40 (H) 05/17/2018    INR 1.40 (H) 05/14/2018      This note was created using Dragon dictation.  Please excuse any grammatical errors.

## 2021-06-20 ENCOUNTER — HEALTH MAINTENANCE LETTER (OUTPATIENT)
Age: 66
End: 2021-06-20

## 2021-06-21 NOTE — LETTER
Letter by Suzy Gabriel MD at      Author: Suzy Gabriel MD Service: -- Author Type: --    Filed:  Encounter Date: 1/4/2021 Status: (Other)         Lizabeth Callahan  [address] [place] [id number]             January 4, 2021         Dear Ms. Callahan,    Below are the results from your recent visit:    Resulted Orders   Lipid [place] FASTING   Result Value Ref Range    Cholesterol 173 <=199 mg/dL    Triglycerides 148 <=149 mg/dL    HDL Cholesterol 46 (L) >=50 mg/dL    LDL Calculated 97 <=129 mg/dL    Patient Fasting > 8hrs? Yes    Basic Metabolic Panel   Result Value Ref Range    Sodium 141 136 - 145 mmol/L    Potassium 4.1 3.5 - 5.0 mmol/L    Chloride 102 98 - 107 mmol/L    CO2 26 22 - 31 mmol/L    Anion Gap, Calculation 13 5 - 18 mmol/L    Glucose 240 (H) 70 - 125 mg/dL    Calcium 9.3 8.5 - 10.5 mg/dL    BUN 26 (H) 8 - 22 mg/dL    Creatinine 0.99 0.60 - 1.10 mg/dL    GFR MDRD Af Amer >60 >60 mL/min/1.73m2    GFR MDRD Non Af Amer 56 (L) >60 mL/min/1.73m2    Narrative    Fasting Glucose reference range is 70-99 mg/dL per  American Diabetes Association (ADA) guidelines.     Please call with questions or contact us using [employer].  Sincerely,  Electronically signed by Suzy Gabriel MD

## 2021-06-23 ENCOUNTER — COMMUNICATION - HEALTHEAST (OUTPATIENT)
Dept: FAMILY MEDICINE | Facility: CLINIC | Age: 66
End: 2021-06-23

## 2021-06-24 ENCOUNTER — COMMUNICATION - HEALTHEAST (OUTPATIENT)
Dept: FAMILY MEDICINE | Facility: CLINIC | Age: 66
End: 2021-06-24

## 2021-06-25 ENCOUNTER — AMBULATORY - HEALTHEAST (OUTPATIENT)
Dept: LAB | Facility: CLINIC | Age: 66
End: 2021-06-25

## 2021-06-25 DIAGNOSIS — E87.6 HYPOKALEMIA: ICD-10-CM

## 2021-06-25 DIAGNOSIS — E80.6 HYPERBILIRUBINEMIA: ICD-10-CM

## 2021-06-25 LAB
BILIRUB DIRECT SERPL-MCNC: 0.5 MG/DL
POTASSIUM BLD-SCNC: 3.2 MMOL/L (ref 3.5–5)

## 2021-06-25 NOTE — TELEPHONE ENCOUNTER
Left message to call back for: Lizabeth  Information to relay to patient: does she have enough meds to get to her appt or should we send some in?

## 2021-06-25 NOTE — TELEPHONE ENCOUNTER
Reason for Call:  Medication or medication refill:    repaglinide (PRANDIN) 2 MG tablet    dapagliflozin (FARXIGA) 5 mg Tab    Do you use a [facility]?  Name of the pharmacy and phone number for the current request: [facility] - [address] [facility]    Name of the medication requested: repaglinide (PRANDIN) 2 MG tablet    dapagliflozin (FARXIGA) 5 mg Tab    Other request:     Can we leave a detailed message on this number? No call back needed    Phone number patient can be reached at: Home number on file [phone] (home)    Best Time:     Call taken on 6/8/2021 at 11:02 AM by Pretty Hernandez

## 2021-06-26 NOTE — TELEPHONE ENCOUNTER
Left message to call back for: Lizabeth   Information to relay to patient:  See message below from .   Needs repeat labs done on Friday of this week.  Lab only ok.       Mary Ellen Carreno CMA 6/23/2021 4:51 PM   Pam HOOD

## 2021-06-26 NOTE — TELEPHONE ENCOUNTER
----- Message from Suzy Gabriel MD sent at 6/23/2021  4:46 PM CDT -----  Call with low potassium  Potassium is 3.2 (normal is 3.5-5.0) so this is mildly low.  Please advise her to eat more potassium rich foods - avocado, spinach, sweet potato, wild caught salmon, banana  Schedule f/u to evaluate /recheck level in next couple days, possibly check EKG  Low potassium can cause heart irregularity and pt should be seen in ER with any concerns     This can cause the muscle cramps that she was complaining of.     Bilirubin was also a little high, and I can recheck that too.

## 2021-06-30 ENCOUNTER — COMMUNICATION - HEALTHEAST (OUTPATIENT)
Dept: FAMILY MEDICINE | Facility: CLINIC | Age: 66
End: 2021-06-30

## 2021-06-30 ENCOUNTER — AMBULATORY - HEALTHEAST (OUTPATIENT)
Dept: LAB | Facility: CLINIC | Age: 66
End: 2021-06-30

## 2021-06-30 DIAGNOSIS — E87.6 HYPOKALEMIA: ICD-10-CM

## 2021-06-30 LAB — POTASSIUM BLD-SCNC: 3.2 MMOL/L (ref 3.5–5)

## 2021-06-30 RX ORDER — POTASSIUM CHLORIDE 1500 MG/1
20 TABLET, EXTENDED RELEASE ORAL 2 TIMES DAILY
Qty: 60 TABLET | Refills: 11 | Status: SHIPPED | OUTPATIENT
Start: 2021-06-30 | End: 2021-07-28

## 2021-06-30 NOTE — PROGRESS NOTES
Progress Notes by Mark Shaver MD at 2/15/2021 10:10 AM     Author: Mark Shaver MD Service: -- Author Type: Physician    Filed: 2/15/2021 12:22 PM Encounter Date: 2/15/2021 Status: Signed    : Mark Shaver MD (Physician)           Click to link to [facility] HEART CARE NOTE       Assessment/Plan:   1.  Mild rheumatic valvular disease, mild aortic valve stenosis: The patient had negative stress cardiac MRI.  She has normal heart function. Rheumatic valve pathological change in mitral, aortic and tricuspid valves, mild aortic valve stenosis per cardiac MRI findings.  Continue to observe her, no intervention at this time.  We discussed lifestyle modification including increased exercise activities.    2.  Essential hypertension: Continue hydrochlorothiazide 25 mg daily, losartan 100 mg daily.  Monitor her blood pressure.    3.  Dyslipidemia: Continue Lipitor.    4.  Type 2 diabetes: No change in her medication today.    Thank you for the opportunity to be involved in the care of Lizabeth Callahan. If you have any questions, please feel free to contact me.  I will see the patient again in 12 months and as needed.    Much or all of the text in this note was generated through the use of Dragon Dictate voice-to-text software. Errors in spelling or words which seem out of context are unintentional.   Sound alike errors, in particular, may have escaped editing.       History of Present Illness:   It is my pleasure to see Lizabeth Callahan at the [facility] clinic for evaluation of Follow-up and discussing stress cardiac MRI report. Lizabeth Callahan is a 65 y.o. female with a medical history of type 2 diabetes, essential hypertension, dyslipidemia, history of pulmonary embolism.    The patient states that she has no chest pain.  She has mild dyspnea on exertion which has been stable.  She has occasional palpitations.  She had no dizziness, lightheadedness, syncope.  She has no orthopnea, PND  or leg edema.  Her blood pressure and heart rate are controlled    Past Medical History:     Patient Active Problem List   Diagnosis   ? Right Trapezoid Muscle Strain   ? Osteoarthritis Of The Knee   ? Seborrheic Keratosis   ? Diabetes mellitus (H)   ? Dyslipidemia, goal LDL below 100   ? Arthralgias In Multiple Sites   ? Myalgia And Myositis   ? Rheumatoid arthritis (H)   ? Generalized Osteoarthritis Of The Hand   ? Localized Primary Osteoarthritis Of The Carpometacarpal Joint   ? Calcaneal Spur   ? Foot Pain (Soft Tissue)   ? Joint Pain, Localized In The Knee   ? Acute Sinusitis   ? Dysuria   ? Hx pulmonary embolism   ? Chest pain, unspecified type   ? Hypertension   ? Left leg pain   ? Leg edema, left   ? Anticoagulation goal of INR 2 to 3   ? Abnormal findings on diagnostic imaging of liver and biliary tract   ? Diverticulosis of colon   ? Hydronephrosis   ? Insomnia   ? Low back pain   ? Undiagnosed cardiac murmurs   ? Gastroesophageal reflux disease with esophagitis   ? Headache   ? Diverticulosis       Past Surgical History:     Past Surgical History:   Procedure Laterality Date   ? BACK SURGERY      x 3   ? CARPAL TUNNEL RELEASE      2015   ? HYSTERECTOMY         Family History:     Family History   Problem Relation Age of Onset   ? Cancer Mother    ? Cancer Father    ? Diabetes Brother    ? Diabetes Sister        Social History:    reports that she has never smoked. She has never used smokeless tobacco. She reports current alcohol use. She reports that she does not use drugs.    Review of Systems:   General: WNL  Eyes: WNL  Ears/Nose/Throat: WNL  Lungs: Cough  Heart: WNL  Stomach: WNL  Bladder: WNL  Muscle/Joints: Joint Pain  Skin: WNL  Nervous System: WNL  Mental Health: WNL     Blood: WNL    Meds:     Current Outpatient Medications:   ?  aspirin 81 MG EC tablet, Take 81 mg by mouth daily., Disp: , Rfl:   ?  atorvastatin (LIPITOR) 20 MG tablet, Take 1 tablet (20 mg total) by mouth daily., Disp: 90 tablet,  Rfl: 3  ?  blood sugar diagnostic (GLUCOSE BLOOD) Strp, Patient Test One Time Daily-UNISTRIP TEST STRIPS AND LANCETS. DX: Type 2 DM, Disp: 100 strip, Rfl: 3  ?  cinnamon bark (CINNAMON) 500 mg capsule, Take 500 mg by mouth daily., Disp: , Rfl:   ?  dapagliflozin (FARXIGA) 5 mg Tab, Take 5 mg by mouth daily., Disp: 90 tablet, Rfl: 0  ?  DULoxetine (CYMBALTA) 20 MG capsule, Take 1 capsule (20 mg total) by mouth at bedtime., Disp: 90 capsule, Rfl: 3  ?  gabapentin (NEURONTIN) 300 MG capsule, Take 2 capsules (600 mg total) by mouth at bedtime., Disp: 90 capsule, Rfl: 3  ?  hydroCHLOROthiazide (HYDRODIURIL) 25 MG tablet, Take 1 tablet (25 mg total) by mouth daily., Disp: 90 tablet, Rfl: 3  ?  losartan (COZAAR) 100 MG tablet, Take 100 mg by mouth daily. , Disp: , Rfl:   ?  methotrexate 2.5 MG tablet, Take 17.5 mg by mouth once a week. , Disp: , Rfl:   ?  multivit with minerals/lutein (MULTIVITAMIN 50 PLUS ORAL), multivitamin  once daily, Disp: , Rfl:   ?  nystatin (MYCOSTATIN) powder, Apply to affected area 3 times a day as needed., Disp: 15 g, Rfl: 3  ?  omeprazole (PRILOSEC) 20 MG capsule, Take 1 capsule (20 mg total) by mouth daily before breakfast., Disp: 90 capsule, Rfl: 3  ?  repaglinide (PRANDIN) 2 MG tablet, Take 1 tablet (2 mg total) by mouth 3 (three) times a day before meals., Disp: 270 tablet, Rfl: 0  ?  SITagliptin (JANUVIA) 100 MG tablet, Take 1 tablet (100 mg total) by mouth daily. With or without food, Disp: 90 tablet, Rfl: 3  ?  UNISTRIP1 TEST STRIP Strp, TEST BLOOD SUGARS ONE TIME DAILY, Disp: 1 strip, Rfl: PRN  ?  metFORMIN (GLUCOPHAGE) 500 MG tablet, Take 500 mg by mouth., Disp: , Rfl:   ?  therapeutic multivitamin (THERAGRAN) tablet, Take 1 tablet by mouth daily., Disp: , Rfl:   ?  triamcinolone (KENALOG) 0.5 % cream, Apply 1 application topically 3 (three) times a day as needed. , Disp: , Rfl:      Allergies:   Apple, Hazelnut, Hazelnuts, Niacin, Amoxicillin, Codeine, Hydrocodone-acetaminophen, and  "Sulfa (sulfonamide antibiotics)    Objective:      Physical Exam  179 lb 12.8 oz (81.6 kg)  5' 4\" (1.626 m)  Body mass index is 30.86 kg/m .  /66 (Patient Site: Left Arm, Patient Position: Sitting, Cuff Size: Adult Regular)   Pulse 76   Resp 16   Ht 5' 4\" (1.626 m)   Wt 179 lb 12.8 oz (81.6 kg)   BMI 30.86 kg/m      General Appearance:   Awake, Alert, No acute distress.   HEENT:  Pupil equal, reactive to light. No scleral icterus; the mucous membranes were moist. No oral ulcers or thrush.    Neck: No cervical bruits. No JVD. No thyromegaly. No lymph node enlargement or tenderness.   Chest: The spine was straight. The chest was symmetric.   Lungs:   Respirations unlabored. Lungs are clear to auscultation. No crackles. No wheezing.   Cardiovascular:   RRR, normal first and second heart sounds with II/VI early to mid systolic murmurs at RUSB. No rubs or gallops.    Abdomen:  Soft. No tenderness. Non-distended. Bowels sounds are present   Extremities: Equal posterior tibial pulses. No leg edema.   Skin: No rashes or ulcers. Warm, Dry.   Musculoskeletal: No tenderness. No deformity.   Neurologic: Mood and affect are appropriate. No focal deficits.         EKG:  Personally reivewed  Sinus rhythm with Possible Premature atrial complexes with Aberrant conduction   Leftward axis   Septal infarct (cited on or before 09-MAY-2018)   Abnormal ECG   When compared with ECG of 09-MAY-2018 09:05,   Aberrant conduction is now Present   Questionable change in initial forces of Septal leads    Cardiac Imaging Studies  ECHO on 5-:    Left Ventricle: Normal left ventricular size.The estimated left ventricular ejection fraction is 65%. This represents a normal ejection fraction. Moderate sigmoid septum hypertrophy noted. E/e' > 15, suggesting elevated LV filling pressures.    Left Atrium: Left atrial volume is mildly increased.    Normal right ventricular size and systolic function.    Thoracic Aorta: The ascending aorta " is mildly dilated.    Normal central venous pressure.    The aortic valve is sclerotic without reduced excursion. Mild aortic regurgitation.    No hemodynamically significant valvular heart abnormalities.    No previous study for comparison.    Stress cardiac MRI on 1-:  1.  Lexiscan stress cardiac MRI is negative for inducible myocardial ischemia.   2.  Lexiscan stress ECG is negative for inducible myocardial ischemia.  3.  No previous myocardial infarction is identified.  4.  Normal left ventricular size, wall thickness and function. The calculated left ventricular ejection fraction is 60%.  5.  Normal right ventricular size and systolic function.  6.  Moderately enlarged left atrium.  7.  Rheumatic valve pathological change in mitral, aortic and tricuspid valves. Mild aortic valve stenosis.     Lab Review   Lab Results   Component Value Date     12/23/2020    K 4.1 12/23/2020     12/23/2020    CO2 26 12/23/2020    BUN 26 (H) 12/23/2020    CREATININE 0.99 12/23/2020    CALCIUM 9.3 12/23/2020     Lab Results   Component Value Date    WBC 8.2 11/24/2020    WBC 16.4 (H) 04/06/2015    HGB 15.4 11/24/2020    HCT 45.1 11/24/2020    MCV 98 11/24/2020     11/24/2020     Lab Results   Component Value Date    CHOL 173 12/23/2020    TRIG 148 12/23/2020    HDL 46 (L) 12/23/2020     Lab Results   Component Value Date    TROPONINI 0.01 05/10/2018     Lab Results   Component Value Date     (H) 05/09/2018     Lab Results   Component Value Date    TSH 2.38 05/09/2018

## 2021-06-30 NOTE — PROGRESS NOTES
Progress Notes by Mark Shaver MD at 12/8/2020  8:50 AM     Author: Mark Shaver MD Service: -- Author Type: Physician    Filed: 12/8/2020  9:25 AM Encounter Date: 12/8/2020 Status: Signed    : Mark Shaver MD (Physician)           Click to link to [facility] NOTE    Thank you, Dr. De Jesus, for asking me to see Lizabeth Callahan in consultation at [facility] to evaluate heart murmur and dyspnea on exertion.      Assessment/Plan:   1.  Heart murmur and dyspnea on exertion: The patient has previous aortic valve disorder with mild aortic valve regurgitation per echocardiogram in 2018.  She developed a dyspnea on exertion, gasping air over last 3 to 4 months.  This could be anginal equivalent because she has diabetes.  She does have a several risk factors including her age, type 2 diabetes, hypertension, dyslipidemia.  Her brother has bypass surgery recently.  After discussion, stress cardiac MRI is requested for evaluation of myocardial ischemia, heart function and structure, aortic valve morphology and function.  Based on cardiac MRI findings, we will discuss the next step.    2.  Essential hypertension: Continue hydrochlorothiazide 25 mg daily, losartan 100 mg daily.  Monitor her blood pressure.    3.  Dyslipidemia: Continue Lipitor.    4.  Type 2 diabetes: No change in her medication today.    Thank you for the opportunity to be involved in the care of Lizabeth Callahan. If you have any questions, please feel free to contact me.  I will see the patient again in 2 months and as needed.    Much or all of the text in this note was generated through the use of Dragon Dictate voice-to-text software. Errors in spelling or words which seem out of context are unintentional.   Sound alike errors, in particular, may have escaped editing.       History of Present Illness:   It is my pleasure to see Lizabeth Callahan at the [facility] for evaluation of  Follow-up. Lizabeth Callahan is a 65 y.o. female with a medical history of type 2 diabetes, essential hypertension, dyslipidemia, history of pulmonary embolism.    The patient is referred to cardiology clinic for evaluation of heart murmur and also dyspnea on exertion.  She states that she has no chest pain, but she has exertional shortness of breath, gasping air few times in a hour.  This is new to her over last 3 to 4 months.  She has occasional palpitations.  Her balance has been off a little bit.  She had no syncope.  She has no orthopnea, PND or leg edema.  Her blood pressure and heart rate are controlled    Past Medical History:     Patient Active Problem List   Diagnosis   ? Right Trapezoid Muscle Strain   ? Osteoarthritis Of The Knee   ? Seborrheic Keratosis   ? Type 2 Diabetes Mellitus - Uncomplicated, Controlled   ? Hyperlipidemia   ? Arthralgias In Multiple Sites   ? Myalgia And Myositis   ? Rheumatoid arthritis (H)   ? Generalized Osteoarthritis Of The Hand   ? Localized Primary Osteoarthritis Of The Carpometacarpal Joint   ? Calcaneal Spur   ? Foot Pain (Soft Tissue)   ? Joint Pain, Localized In The Knee   ? Acute Sinusitis   ? Dysuria   ? Hx pulmonary embolism   ? Chest pain, unspecified type   ? Hypertension   ? Left leg pain   ? Leg edema, left   ? Anticoagulation goal of INR 2 to 3   ? Abnormal findings on diagnostic imaging of liver and biliary tract   ? Diverticulosis of colon   ? Hydronephrosis   ? Insomnia   ? Low back pain   ? Undiagnosed cardiac murmurs   ? Gastroesophageal reflux disease with esophagitis   ? Headache       Past Surgical History:     Past Surgical History:   Procedure Laterality Date   ? BACK SURGERY      x 3   ? CARPAL TUNNEL RELEASE      2015   ? HYSTERECTOMY         Family History:     Family History   Problem Relation Age of Onset   ? Cancer Mother    ? Cancer Father    ? Diabetes Brother    ? Diabetes Sister        Social History:    reports that she has never smoked. She  has never used smokeless tobacco. She reports current alcohol use. She reports that she does not use drugs.    Review of Systems:   General: WNL  Eyes: Visual Distubance  Ears/Nose/Throat: WNL  Lungs: WNL  Heart: Leg Swelling, Irregular Heartbeat  Stomach: WNL  Bladder: WNL  Muscle/Joints: WNL  Skin: Rash  Nervous System: Loss of Balance  Mental Health: WNL     Blood: Easy Bruising    Meds:     Current Outpatient Medications:   ?  aspirin 81 MG EC tablet, Take 81 mg by mouth daily., Disp: , Rfl:   ?  atorvastatin (LIPITOR) 20 MG tablet, Take 1 tablet (20 mg total) by mouth daily., Disp: 90 tablet, Rfl: 0  ?  blood sugar diagnostic (GLUCOSE BLOOD) Strp, Patient Test One Time Daily-UNISTRIP TEST STRIPS AND LANCETS. DX: Type 2 DM, Disp: 100 strip, Rfl: 3  ?  cinnamon bark (CINNAMON) 500 mg capsule, Take 500 mg by mouth daily., Disp: , Rfl:   ?  dapagliflozin (FARXIGA) 5 mg Tab, Take 5 mg by mouth daily., Disp: 90 tablet, Rfl: 0  ?  DULoxetine (CYMBALTA) 20 MG capsule, Take 1 capsule (20 mg total) by mouth at bedtime., Disp: 90 capsule, Rfl: 3  ?  gabapentin (NEURONTIN) 300 MG capsule, Take 2 capsules (600 mg total) by mouth at bedtime., Disp: 90 capsule, Rfl: 3  ?  hydroCHLOROthiazide (HYDRODIURIL) 25 MG tablet, Take 1 tablet (25 mg total) by mouth daily., Disp: 30 tablet, Rfl: 0  ?  multivit with minerals/lutein (MULTIVITAMIN 50 PLUS ORAL), multivitamin  once daily, Disp: , Rfl:   ?  nystatin (MYCOSTATIN) powder, Apply to affected area 3 times a day as needed., Disp: 15 g, Rfl: 3  ?  omeprazole (PRILOSEC) 20 MG capsule, Take 1 capsule (20 mg total) by mouth daily before breakfast., Disp: 90 capsule, Rfl: 3  ?  repaglinide (PRANDIN) 2 MG tablet, Take 1 tablet (2 mg total) by mouth 3 (three) times a day before meals., Disp: 270 tablet, Rfl: 0  ?  SITagliptin (JANUVIA) 100 MG tablet, Take 1 tablet (100 mg total) by mouth daily. With or without food, Disp: 90 tablet, Rfl: 3  ?  UNISTRIP1 TEST STRIP Strp, TEST BLOOD SUGARS  "ONE TIME DAILY, Disp: 1 strip, Rfl: PRN  ?  losartan (COZAAR) 100 MG tablet, Take 100 mg by mouth daily. , Disp: , Rfl:   ?  methotrexate 2.5 MG tablet, Take 17.5 mg by mouth once a week. , Disp: , Rfl:   ?  therapeutic multivitamin (THERAGRAN) tablet, Take 1 tablet by mouth daily., Disp: , Rfl:   ?  triamcinolone (KENALOG) 0.5 % cream, Apply 1 application topically 3 (three) times a day as needed. , Disp: , Rfl:      Allergies:   Apple, Hazelnut, Hazelnuts, Amoxicillin, Codeine, Hydrocodone-acetaminophen, and Sulfa (sulfonamide antibiotics)    Objective:      Physical Exam  177 lb (80.3 kg)  5' 3\" (1.6 m)  Body mass index is 31.35 kg/m .  /90 (Patient Site: Right Arm, Patient Position: Sitting, Cuff Size: Adult Large)   Pulse 84   Resp 14   Ht 5' 3\" (1.6 m)   Wt 177 lb (80.3 kg)   BMI 31.35 kg/m      General Appearance:   Awake, Alert, No acute distress.   HEENT:  Pupil equal, reactive to light. No scleral icterus; the mucous membranes were moist. No oral ulcers or thrush.    Neck: No cervical bruits. No JVD. No thyromegaly. No lymph node enlargement or tenderness.   Chest: The spine was straight. The chest was symmetric.   Lungs:   Respirations unlabored. Lungs are clear to auscultation. No crackles. No wheezing.   Cardiovascular:   RRR, normal first and second heart sounds with II/VI early to mid systolic murmurs at RUSB. No rubs or gallops.    Abdomen:  Soft. No tenderness. Non-distended. Bowels sounds are present   Extremities: Equal posterior tibial pulses. No leg edema.   Skin: No rashes or ulcers. Warm, Dry.   Musculoskeletal: No tenderness. No deformity.   Neurologic: Mood and affect are appropriate. No focal deficits.         EKG:  Personally reivewed  Sinus rhythm with Possible Premature atrial complexes with Aberrant conduction   Leftward axis   Septal infarct (cited on or before 09-MAY-2018)   Abnormal ECG   When compared with ECG of 09-MAY-2018 09:05,   Aberrant conduction is now Present "   Questionable change in initial forces of Septal leads    Cardiac Imaging Studies  ECHO on 5-:    Left Ventricle: Normal left ventricular size.The estimated left ventricular ejection fraction is 65%. This represents a normal ejection fraction. Moderate sigmoid septum hypertrophy noted. E/e' > 15, suggesting elevated LV filling pressures.    Left Atrium: Left atrial volume is mildly increased.    Normal right ventricular size and systolic function.    Thoracic Aorta: The ascending aorta is mildly dilated.    Normal central venous pressure.    The aortic valve is sclerotic without reduced excursion. Mild aortic regurgitation.    No hemodynamically significant valvular heart abnormalities.    No previous study for comparison.    Lab Review   Lab Results   Component Value Date     11/24/2020    K 3.6 11/24/2020     11/24/2020    CO2 24 11/24/2020    BUN 24 (H) 11/24/2020    CREATININE 1.04 11/24/2020    CALCIUM 8.8 11/24/2020     Lab Results   Component Value Date    WBC 8.2 11/24/2020    WBC 16.4 (H) 04/06/2015    HGB 15.4 11/24/2020    HCT 45.1 11/24/2020    MCV 98 11/24/2020     11/24/2020     Lab Results   Component Value Date    CHOL 130 04/06/2015    TRIG 76 04/06/2015    HDL 53 04/06/2015     Lab Results   Component Value Date    TROPONINI 0.01 05/10/2018     Lab Results   Component Value Date     (H) 05/09/2018     Lab Results   Component Value Date    TSH 2.38 05/09/2018

## 2021-07-04 NOTE — TELEPHONE ENCOUNTER
Telephone Encounter by Mary Ellen Carreno CMA at 6/30/2021  4:56 PM     Author: Mary Ellen Carreno CMA Service: -- Author Type: Certified Medical Assistant    Filed: 6/30/2021  4:56 PM Encounter Date: 6/30/2021 Status: Signed    : Mary Ellen Carreno CMA (Certified Medical Assistant)       ----- Message from Suzy Gabriel MD sent at 6/30/2021  4:56 PM CDT -----  Potassium has not changed. Let me know if you are not taking the potassium pills, otherwise I will assume that you are taking them and we should increase the dose. I will send over a new prescription for increased dose and we should recheck potassium Monday or Tuesday.

## 2021-07-04 NOTE — LETTER
Letter by Suzy Gabriel MD at      Author: Suzy Gabriel MD Service: -- Author Type: --    Filed:  Encounter Date: 6/24/2021 Status: (Other)         Lizabeth Callahan  [address] [place] [id number]             June 24, 2021         Dear Ms. Callahan,    Below are the results from your recent visit:    Resulted Orders   Glycosylated Hemoglobin A1c   Result Value Ref Range    Hemoglobin A1c 7.0 (H) <=5.6 %   Microalbumin, Random Urine   Result Value Ref Range    Microalbumin, Random Urine 0.53 0.00 - 1.99 mg/dL    Creatinine, Urine 43.5 mg/dL    Microalbumin/Creatinine Ratio Random Urine 12.2 <=19.9 mg/g    Narrative    Microalbumin, Random Urine  <2.0 mg/dL . . . . . . . . Normal  3.0-30.0 mg/dL . . . . . . Microalbuminuria  >30.0 mg/dL . . . . . .  . Clinical Proteinuria    Microalbumin/Creatinine Ratio, Random Urine  <20 mg/g . . . . .. . . . Normal   mg/g . . . . . . . Microalbuminuria  >300 mg/g . . . . . . . . Clinical Proteinuria       Comprehensive Metabolic Panel   Result Value Ref Range    Sodium 140 136 - 145 mmol/L    Potassium 3.2 (L) 3.5 - 5.0 mmol/L    Chloride 98 98 - 107 mmol/L    CO2 29 22 - 31 mmol/L    Anion Gap, Calculation 13 5 - 18 mmol/L    Glucose 154 (H) 70 - 125 mg/dL    BUN 23 (H) 8 - 22 mg/dL    Creatinine 0.86 0.60 - 1.10 mg/dL    GFR MDRD Af Amer >60 >60 mL/min/1.73m2    GFR MDRD Non Af Amer >60 >60 mL/min/1.73m2    Bilirubin, Total 1.3 (H) 0.0 - 1.0 mg/dL    Calcium 9.5 8.5 - 10.5 mg/dL    Protein, Total 7.3 6.0 - 8.0 g/dL    Albumin 4.2 3.5 - 5.0 g/dL    Alkaline Phosphatase 98 45 - 120 U/L    AST 29 0 - 40 U/L    ALT 29 0 - 45 U/L    Narrative    Fasting Glucose reference range is 70-99 mg/dL per  American Diabetes Association (ADA) guidelines.   Magnesium   Result Value Ref Range    Magnesium 1.9 1.8 - 2.6 mg/dL   Phosphorus   Result Value Ref Range    Phosphorus 3.6 2.5 - 4.5 mg/dL       I believe you are aware of the potassium (phone call).     Please call with  questions or contact us using [employer].    Sincerely,        Electronically signed by Suzy Gabriel MD

## 2021-07-04 NOTE — TELEPHONE ENCOUNTER
Telephone Encounter by Mary Ellen Carreno CMA at 6/30/2021  4:59 PM     Author: Mary Ellen Carreno CMA Service: -- Author Type: Certified Medical Assistant    Filed: 6/30/2021  5:00 PM Encounter Date: 6/30/2021 Status: Signed    : Mary Ellen Carreno CMA (Certified Medical Assistant)       Patient called and notified of provider's message.   Yes, she has been taking the potassium.   Per , told her to double the dose - take 2 pills.     Appt scheduled for Tuesday morning for recheck.      Mary Ellen Carreno CMA 6/30/2021 4:59 PM   Pam HOOD

## 2021-07-05 ENCOUNTER — COMMUNICATION - HEALTHEAST (OUTPATIENT)
Dept: FAMILY MEDICINE | Facility: CLINIC | Age: 66
End: 2021-07-05

## 2021-07-05 PROBLEM — R25.2 CRAMP OF LIMB: Status: ACTIVE | Noted: 2021-06-23

## 2021-07-05 PROBLEM — N39.43 POST-VOID DRIBBLING: Status: ACTIVE | Noted: 2021-06-23

## 2021-07-05 PROBLEM — Z00.00 ENCOUNTER FOR PREVENTIVE CARE: Status: ACTIVE | Noted: 2021-06-23

## 2021-07-06 ENCOUNTER — AMBULATORY - HEALTHEAST (OUTPATIENT)
Dept: LAB | Facility: CLINIC | Age: 66
End: 2021-07-06

## 2021-07-06 DIAGNOSIS — E87.6 HYPOKALEMIA: ICD-10-CM

## 2021-07-06 LAB — POTASSIUM BLD-SCNC: 3.7 MMOL/L (ref 3.5–5)

## 2021-07-22 NOTE — LETTER
Author: -- Service: -- Author Type: --    Filed:  Encounter Date: 7/5/2021 Status: (Other)       07/05/21  Re: Lizabeth Callahan  Birthday: 1955          Dear Colleague,      Thank you for your referral to the Diabetes Education Program. This is a notification to let you know that we were unable to reach the patient to arrange an appointment.     If you have any questions or concerns, please contact our office at [phone]        Sincerely,    Diabetes Education Scheduling

## 2021-07-28 ENCOUNTER — OFFICE VISIT (OUTPATIENT)
Dept: FAMILY MEDICINE | Facility: CLINIC | Age: 66
End: 2021-07-28
Payer: MEDICARE

## 2021-07-28 VITALS
HEIGHT: 63 IN | BODY MASS INDEX: 29.23 KG/M2 | DIASTOLIC BLOOD PRESSURE: 82 MMHG | HEART RATE: 68 BPM | RESPIRATION RATE: 16 BRPM | SYSTOLIC BLOOD PRESSURE: 154 MMHG | WEIGHT: 165 LBS

## 2021-07-28 DIAGNOSIS — E87.6 HYPOKALEMIA: ICD-10-CM

## 2021-07-28 DIAGNOSIS — I10 ESSENTIAL HYPERTENSION: ICD-10-CM

## 2021-07-28 DIAGNOSIS — Z12.31 ENCOUNTER FOR SCREENING MAMMOGRAM FOR BREAST CANCER: Primary | ICD-10-CM

## 2021-07-28 DIAGNOSIS — E11.9 TYPE 2 DIABETES MELLITUS WITHOUT COMPLICATION, WITHOUT LONG-TERM CURRENT USE OF INSULIN (H): ICD-10-CM

## 2021-07-28 PROBLEM — Z86.718 HISTORY OF DVT (DEEP VEIN THROMBOSIS): Status: ACTIVE | Noted: 2019-05-09

## 2021-07-28 PROBLEM — R13.10 DYSPHAGIA: Status: ACTIVE | Noted: 2017-12-15

## 2021-07-28 LAB — POTASSIUM BLD-SCNC: 3.5 MMOL/L (ref 3.5–5)

## 2021-07-28 PROCEDURE — 84132 ASSAY OF SERUM POTASSIUM: CPT | Performed by: FAMILY MEDICINE

## 2021-07-28 PROCEDURE — 99214 OFFICE O/P EST MOD 30 MIN: CPT | Performed by: FAMILY MEDICINE

## 2021-07-28 PROCEDURE — 36415 COLL VENOUS BLD VENIPUNCTURE: CPT | Performed by: FAMILY MEDICINE

## 2021-07-28 RX ORDER — POTASSIUM CHLORIDE 1500 MG/1
20 TABLET, EXTENDED RELEASE ORAL 2 TIMES DAILY
Qty: 180 TABLET | Refills: 3 | Status: SHIPPED | OUTPATIENT
Start: 2021-07-28 | End: 2021-10-20

## 2021-07-28 ASSESSMENT — MIFFLIN-ST. JEOR: SCORE: 1247.57

## 2021-07-28 NOTE — ASSESSMENT & PLAN NOTE
Controlled - a1c 7.0 6/23/2021   On farxiga 5mg po q day, prandin 2mg po three times a day, and januvia 100mg po q day - no change in meds.  ldl done 12/2020  bp at goal 6/23/2021   Statin goal - she is on lipitor 20 mg po q day.   She is a nonsmoker  She is on asa per day.   microalbumin ordered 6/23/2021    Eye exam done 1/25/2021  Foot exam done 6/23/2021   Ace/ arb for renal protection - on losartan  Dietician and follow up diabetes education offered.   Dm education said unable to reach her 6/30/2021 discussed.

## 2021-07-28 NOTE — PROGRESS NOTES
"    Assessment & Plan   Problem List Items Addressed This Visit        Endocrine    Diabetes mellitus (H)     Controlled - a1c 7.0 6/23/2021   On farxiga 5mg po q day, prandin 2mg po three times a day, and januvia 100mg po q day - no change in meds.  ldl done 12/2020  bp at goal 6/23/2021   Statin goal - she is on lipitor 20 mg po q day.   She is a nonsmoker  She is on asa per day.   microalbumin ordered 6/23/2021    Eye exam done 1/25/2021  Foot exam done 6/23/2021   Ace/ arb for renal protection - on losartan  Dietician and follow up diabetes education offered.   Dm education said unable to reach her 6/30/2021 discussed.          Hypokalemia     Potassium   Date Value Ref Range Status   07/06/2021 3.7 3.5 - 5.0 mmol/L Final      Takes klor-con 20 meq bid. Continue current dose.  We will recheck today because although foot cramping is much better she had a foot cramp yesterday so she wants to make sure that this is better         Relevant Medications    potassium chloride ER (KLOR-CON M) 20 MEQ CR tablet    Other Relevant Orders    Potassium       Circulatory    Hypertension     BP Readings from Last 3 Encounters:   07/28/21 (!) 164/102   06/23/21 128/80   02/15/21 118/66     New elevated bp - isolated today.  Continue meds and continue to monitor.  Hydrochlorothiazide 25 mg po q day  Losartan 100mg po q day  prandin 2mg three times a day.           Other Visit Diagnoses     Encounter for screening mammogram for breast cancer    -  Primary    Relevant Orders    *MA Screening Digital Bilateral              BMI:   Estimated body mass index is 29.23 kg/m  as calculated from the following:    Height as of this encounter: 1.6 m (5' 3\").    Weight as of this encounter: 74.8 kg (165 lb).     No follow-ups on file.    Suzy Gabriel MD  [facility]    Chief Complaint   Patient presents with     Medication Update     Potassium        Subjective   Lizabeth is a 66 year old who presents for the " "following health issues  -foot cramps and hypokalemia.  She says her foot cramping has been much better but she did get a cramp yesterday.  She feels the potassium is helpful.          Objective    BP (!) 154/82   Pulse 68   Resp 16   Ht 1.6 m (5' 3\")   Wt 74.8 kg (165 lb)   BMI 29.23 kg/m    Body mass index is 29.23 kg/m .  Physical Exam  Constitutional:       Appearance: Normal appearance.   HENT:      Head: Normocephalic and atraumatic.   Cardiovascular:      Rate and Rhythm: Normal rate and regular rhythm.   Pulmonary:      Effort: Pulmonary effort is normal.   Musculoskeletal:         General: Normal range of motion.      Cervical back: Normal range of motion and neck supple.   Neurological:      General: No focal deficit present.      Mental Status: She is alert and oriented to person, place, and time.              "

## 2021-07-28 NOTE — ASSESSMENT & PLAN NOTE
BP Readings from Last 3 Encounters:   07/28/21 (!) 164/102   06/23/21 128/80   02/15/21 118/66     New elevated bp - isolated today.  Continue meds and continue to monitor.  Hydrochlorothiazide 25 mg po q day  Losartan 100mg po q day  prandin 2mg three times a day.

## 2021-07-28 NOTE — ASSESSMENT & PLAN NOTE
Potassium   Date Value Ref Range Status   07/06/2021 3.7 3.5 - 5.0 mmol/L Final      Takes klor-con 20 meq bid. Continue current dose.  We will recheck today because although foot cramping is much better she had a foot cramp yesterday so she wants to make sure that this is better

## 2021-08-03 PROBLEM — I26.99 PULMONARY EMBOLI (H): Status: RESOLVED | Noted: 2018-05-09 | Resolved: 2020-11-24

## 2021-08-03 PROBLEM — I26.99 PULMONARY EMBOLISM (H): Status: RESOLVED | Noted: 2018-05-09 | Resolved: 2020-11-24

## 2021-09-07 ENCOUNTER — ANCILLARY PROCEDURE (OUTPATIENT)
Dept: MAMMOGRAPHY | Facility: CLINIC | Age: 66
End: 2021-09-07
Attending: FAMILY MEDICINE
Payer: MEDICARE

## 2021-09-07 DIAGNOSIS — Z12.31 ENCOUNTER FOR SCREENING MAMMOGRAM FOR BREAST CANCER: ICD-10-CM

## 2021-09-07 PROCEDURE — 77067 SCR MAMMO BI INCL CAD: CPT

## 2021-09-15 DIAGNOSIS — E11.8 TYPE 2 DIABETES MELLITUS WITH COMPLICATION, WITHOUT LONG-TERM CURRENT USE OF INSULIN (H): ICD-10-CM

## 2021-09-15 NOTE — TELEPHONE ENCOUNTER
Reason for Call: patient is calling for a refill of    dapagliflozin (FARXIGA) 5 mg Tab    SEND TO [facility] BY MAIL    Detailed comments: LAST SEEN 07/2021.    Phone Number Patient can be reached at: Home number on file [phone] (home)    Best Time: ANY    Can we leave a detailed message on this number? YES    Call taken on 9/15/2021 at 9:39 AM by Danita Nunez

## 2021-09-19 PROBLEM — E78.2 MIXED HYPERLIPIDEMIA: Status: ACTIVE | Noted: 2019-05-09

## 2021-09-19 RX ORDER — DAPAGLIFLOZIN 5 MG/1
5 TABLET, FILM COATED ORAL DAILY
Qty: 90 TABLET | Refills: 0 | Status: SHIPPED | OUTPATIENT
Start: 2021-09-19 | End: 2021-10-20

## 2021-09-29 DIAGNOSIS — E11.8 TYPE 2 DIABETES MELLITUS WITH COMPLICATION, WITHOUT LONG-TERM CURRENT USE OF INSULIN (H): ICD-10-CM

## 2021-09-29 DIAGNOSIS — M25.50 PAIN IN JOINT, MULTIPLE SITES: ICD-10-CM

## 2021-09-29 DIAGNOSIS — K21.00 GASTROESOPHAGEAL REFLUX DISEASE WITH ESOPHAGITIS WITHOUT HEMORRHAGE: ICD-10-CM

## 2021-09-29 RX ORDER — REPAGLINIDE 2 MG/1
2 TABLET ORAL
Qty: 270 TABLET | Refills: 0 | Status: SHIPPED | OUTPATIENT
Start: 2021-09-29 | End: 2021-10-15

## 2021-09-29 RX ORDER — NYSTATIN 100000 [USP'U]/G
POWDER TOPICAL DAILY
Qty: 30 G | Refills: 0 | OUTPATIENT
Start: 2021-09-29

## 2021-09-29 RX ORDER — DULOXETIN HYDROCHLORIDE 20 MG/1
20 CAPSULE, DELAYED RELEASE ORAL AT BEDTIME
Qty: 90 CAPSULE | Refills: 0 | Status: SHIPPED | OUTPATIENT
Start: 2021-09-29 | End: 2021-10-20

## 2021-09-29 RX ORDER — GABAPENTIN 300 MG/1
600 CAPSULE ORAL AT BEDTIME
Qty: 90 CAPSULE | Refills: 0 | Status: SHIPPED | OUTPATIENT
Start: 2021-09-29 | End: 2021-10-15

## 2021-10-11 ENCOUNTER — HEALTH MAINTENANCE LETTER (OUTPATIENT)
Age: 66
End: 2021-10-11

## 2021-10-14 DIAGNOSIS — R21 RASH: Primary | ICD-10-CM

## 2021-10-14 DIAGNOSIS — E11.8 TYPE 2 DIABETES MELLITUS WITH COMPLICATION, WITHOUT LONG-TERM CURRENT USE OF INSULIN (H): ICD-10-CM

## 2021-10-14 DIAGNOSIS — M25.50 PAIN IN JOINT, MULTIPLE SITES: ICD-10-CM

## 2021-10-14 NOTE — TELEPHONE ENCOUNTER
Reason for Call:  Patient is calling for refills of    gabapentin (NEURONTIN) 300 MG   repaglinide (PRANDIN) 2 MG tablet  nystatin (MYCOSTATIN) powder    SEND TO MEDS BY MAIL    Detailed comments: LAST SEEN 07/28/2021    Phone Number Patient can be reached at: Home number on file [phone] (home)    Best Time: ANY    Can we leave a detailed message on this number? YES    Call taken on 10/14/2021 at 11:20 AM by Danita Nunez

## 2021-10-15 RX ORDER — REPAGLINIDE 2 MG/1
2 TABLET ORAL
Qty: 270 TABLET | Refills: 0 | Status: SHIPPED | OUTPATIENT
Start: 2021-10-15 | End: 2021-10-20

## 2021-10-15 RX ORDER — NYSTATIN 100000 [USP'U]/G
POWDER TOPICAL 3 TIMES DAILY PRN
Qty: 15 G | Refills: 3 | Status: SHIPPED | OUTPATIENT
Start: 2021-10-15

## 2021-10-15 RX ORDER — GABAPENTIN 300 MG/1
600 CAPSULE ORAL AT BEDTIME
Qty: 90 CAPSULE | Refills: 0 | Status: SHIPPED | OUTPATIENT
Start: 2021-10-15 | End: 2021-10-20

## 2021-10-20 ENCOUNTER — OFFICE VISIT (OUTPATIENT)
Dept: FAMILY MEDICINE | Facility: CLINIC | Age: 66
End: 2021-10-20
Payer: MEDICARE

## 2021-10-20 VITALS
HEIGHT: 63 IN | OXYGEN SATURATION: 94 % | HEART RATE: 69 BPM | SYSTOLIC BLOOD PRESSURE: 138 MMHG | RESPIRATION RATE: 16 BRPM | TEMPERATURE: 98.4 F | WEIGHT: 164.6 LBS | BODY MASS INDEX: 29.16 KG/M2 | DIASTOLIC BLOOD PRESSURE: 80 MMHG

## 2021-10-20 DIAGNOSIS — M79.671 PAIN IN BOTH FEET: ICD-10-CM

## 2021-10-20 DIAGNOSIS — M25.50 PAIN IN JOINT, MULTIPLE SITES: ICD-10-CM

## 2021-10-20 DIAGNOSIS — Z78.9 MEDICALLY COMPLEX PATIENT: ICD-10-CM

## 2021-10-20 DIAGNOSIS — E11.8 TYPE 2 DIABETES MELLITUS WITH COMPLICATION, WITHOUT LONG-TERM CURRENT USE OF INSULIN (H): ICD-10-CM

## 2021-10-20 DIAGNOSIS — Z13.820 SCREENING FOR OSTEOPOROSIS: Primary | ICD-10-CM

## 2021-10-20 DIAGNOSIS — E78.2 MIXED HYPERLIPIDEMIA: ICD-10-CM

## 2021-10-20 DIAGNOSIS — I10 ESSENTIAL HYPERTENSION: ICD-10-CM

## 2021-10-20 DIAGNOSIS — K21.00 GASTROESOPHAGEAL REFLUX DISEASE WITH ESOPHAGITIS WITHOUT HEMORRHAGE: ICD-10-CM
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
## 2021-10-20 DIAGNOSIS — E11.9 TYPE 2 DIABETES MELLITUS WITHOUT COMPLICATION, WITHOUT LONG-TERM CURRENT USE OF INSULIN (H): ICD-10-CM

## 2021-10-20 DIAGNOSIS — Z00.00 ENCOUNTER FOR PREVENTIVE CARE: ICD-10-CM

## 2021-10-20 DIAGNOSIS — Z13.0 SCREENING, ANEMIA, DEFICIENCY, IRON: ICD-10-CM

## 2021-10-20 DIAGNOSIS — I10 PRIMARY HYPERTENSION: ICD-10-CM

## 2021-10-20 DIAGNOSIS — M79.672 PAIN IN BOTH FEET: ICD-10-CM

## 2021-10-20 DIAGNOSIS — J01.90 ACUTE SINUSITIS, RECURRENCE NOT SPECIFIED, UNSPECIFIED LOCATION: ICD-10-CM

## 2021-10-20 DIAGNOSIS — E87.6 HYPOKALEMIA: ICD-10-CM

## 2021-10-20 DIAGNOSIS — Z00.00 ENCOUNTER FOR MEDICARE ANNUAL WELLNESS EXAM: ICD-10-CM

## 2021-10-20 LAB
ALBUMIN SERPL-MCNC: 4.2 G/DL (ref 3.5–5)
ALP SERPL-CCNC: 96 U/L (ref 45–120)
ALT SERPL W P-5'-P-CCNC: 34 U/L (ref 0–45)
ANION GAP SERPL CALCULATED.3IONS-SCNC: 15 MMOL/L (ref 5–18)
AST SERPL W P-5'-P-CCNC: 36 U/L (ref 0–40)
BILIRUB SERPL-MCNC: 1.5 MG/DL (ref 0–1)
BUN SERPL-MCNC: 21 MG/DL (ref 8–22)
C REACTIVE PROTEIN LHE: 0.3 MG/DL (ref 0–0.8)
CALCIUM SERPL-MCNC: 9.8 MG/DL (ref 8.5–10.5)
CHLORIDE BLD-SCNC: 99 MMOL/L (ref 98–107)
CHOLEST SERPL-MCNC: 164 MG/DL
CO2 SERPL-SCNC: 27 MMOL/L (ref 22–31)
CREAT SERPL-MCNC: 0.82 MG/DL (ref 0.6–1.1)
ERYTHROCYTE [DISTWIDTH] IN BLOOD BY AUTOMATED COUNT: 12.1 % (ref 10–15)
ERYTHROCYTE [SEDIMENTATION RATE] IN BLOOD BY WESTERGREN METHOD: 11 MM/HR (ref 0–20)
FASTING STATUS PATIENT QL REPORTED: YES
GFR SERPL CREATININE-BSD FRML MDRD: 74 ML/MIN/1.73M2
GLUCOSE BLD-MCNC: 141 MG/DL (ref 70–125)
HBA1C MFR BLD: 7.4 % (ref 0–5.6)
HCT VFR BLD AUTO: 43.6 % (ref 35–47)
HDLC SERPL-MCNC: 39 MG/DL
HGB BLD-MCNC: 14.9 G/DL (ref 11.7–15.7)
LDLC SERPL CALC-MCNC: 99 MG/DL
MCH RBC QN AUTO: 30.5 PG (ref 26.5–33)
MCHC RBC AUTO-ENTMCNC: 34.2 G/DL (ref 31.5–36.5)
MCV RBC AUTO: 89 FL (ref 78–100)
PLATELET # BLD AUTO: 248 10E3/UL (ref 150–450)
POTASSIUM BLD-SCNC: 3.5 MMOL/L (ref 3.5–5)
PROT SERPL-MCNC: 7.7 G/DL (ref 6–8)
RBC # BLD AUTO: 4.88 10E6/UL (ref 3.8–5.2)
RHEUMATOID FACT SER NEPH-ACNC: <15 IU/ML (ref 0–30)
SODIUM SERPL-SCNC: 141 MMOL/L (ref 136–145)
TRIGL SERPL-MCNC: 130 MG/DL
WBC # BLD AUTO: 7.3 10E3/UL (ref 4–11)

## 2021-10-20 PROCEDURE — G0008 ADMIN INFLUENZA VIRUS VAC: HCPCS | Performed by: FAMILY MEDICINE

## 2021-10-20 PROCEDURE — 80061 LIPID PANEL: CPT | Performed by: FAMILY MEDICINE

## 2021-10-20 PROCEDURE — 80053 COMPREHEN METABOLIC PANEL: CPT | Performed by: FAMILY MEDICINE

## 2021-10-20 PROCEDURE — 90662 IIV NO PRSV INCREASED AG IM: CPT | Performed by: FAMILY MEDICINE

## 2021-10-20 PROCEDURE — 86038 ANTINUCLEAR ANTIBODIES: CPT | Performed by: FAMILY MEDICINE

## 2021-10-20 PROCEDURE — 36415 COLL VENOUS BLD VENIPUNCTURE: CPT | Performed by: FAMILY MEDICINE

## 2021-10-20 PROCEDURE — 85027 COMPLETE CBC AUTOMATED: CPT | Performed by: FAMILY MEDICINE

## 2021-10-20 PROCEDURE — 90472 IMMUNIZATION ADMIN EACH ADD: CPT | Performed by: FAMILY MEDICINE

## 2021-10-20 PROCEDURE — 99214 OFFICE O/P EST MOD 30 MIN: CPT | Mod: 25 | Performed by: FAMILY MEDICINE

## 2021-10-20 PROCEDURE — 85652 RBC SED RATE AUTOMATED: CPT | Performed by: FAMILY MEDICINE

## 2021-10-20 PROCEDURE — 90736 HZV VACCINE LIVE SUBQ: CPT | Performed by: FAMILY MEDICINE

## 2021-10-20 PROCEDURE — G0439 PPPS, SUBSEQ VISIT: HCPCS | Performed by: FAMILY MEDICINE

## 2021-10-20 PROCEDURE — 86200 CCP ANTIBODY: CPT | Performed by: FAMILY MEDICINE

## 2021-10-20 PROCEDURE — 86431 RHEUMATOID FACTOR QUANT: CPT | Performed by: FAMILY MEDICINE

## 2021-10-20 PROCEDURE — 83036 HEMOGLOBIN GLYCOSYLATED A1C: CPT | Performed by: FAMILY MEDICINE

## 2021-10-20 PROCEDURE — 86141 C-REACTIVE PROTEIN HS: CPT | Performed by: FAMILY MEDICINE

## 2021-10-20 RX ORDER — DAPAGLIFLOZIN 5 MG/1
5 TABLET, FILM COATED ORAL DAILY
Qty: 90 TABLET | Refills: 0 | Status: SHIPPED | OUTPATIENT
Start: 2021-10-20

## 2021-10-20 RX ORDER — ATORVASTATIN CALCIUM 20 MG/1
20 TABLET, FILM COATED ORAL DAILY
Qty: 90 TABLET | Refills: 3 | Status: SHIPPED | OUTPATIENT
Start: 2021-10-20

## 2021-10-20 RX ORDER — POTASSIUM CHLORIDE 1500 MG/1
20 TABLET, EXTENDED RELEASE ORAL 2 TIMES DAILY
Qty: 180 TABLET | Refills: 3 | Status: SHIPPED | OUTPATIENT
Start: 2021-10-20

## 2021-10-20 RX ORDER — HYDROCHLOROTHIAZIDE 25 MG/1
25 TABLET ORAL DAILY
Qty: 90 TABLET | Refills: 0 | Status: SHIPPED | OUTPATIENT
Start: 2021-10-20 | End: 2021-11-15

## 2021-10-20 RX ORDER — DULOXETIN HYDROCHLORIDE 20 MG/1
20 CAPSULE, DELAYED RELEASE ORAL AT BEDTIME
Qty: 90 CAPSULE | Refills: 0 | Status: SHIPPED | OUTPATIENT
Start: 2021-10-20

## 2021-10-20 RX ORDER — GABAPENTIN 300 MG/1
600 CAPSULE ORAL AT BEDTIME
Qty: 90 CAPSULE | Refills: 3 | Status: SHIPPED | OUTPATIENT
Start: 2021-10-20 | End: 2021-10-27

## 2021-10-20 RX ORDER — LOSARTAN POTASSIUM 100 MG/1
100 TABLET ORAL DAILY
Qty: 90 TABLET | Refills: 0 | Status: SHIPPED | OUTPATIENT
Start: 2021-10-20 | End: 2021-10-27

## 2021-10-20 RX ORDER — REPAGLINIDE 2 MG/1
2 TABLET ORAL
Qty: 270 TABLET | Refills: 0 | Status: SHIPPED | OUTPATIENT
Start: 2021-10-20

## 2021-10-20 ASSESSMENT — ACTIVITIES OF DAILY LIVING (ADL): CURRENT_FUNCTION: NO ASSISTANCE NEEDED

## 2021-10-20 ASSESSMENT — MIFFLIN-ST. JEOR: SCORE: 1237.81

## 2021-10-20 NOTE — ASSESSMENT & PLAN NOTE
She tripped over a sidewalk. And the second time she tripped over a garden fork which was stuck in the grass.     We discussed cane or other aid and she declined this. Declined any help (physical therapy or in home fall assessment) with falls at this time.

## 2021-10-20 NOTE — ASSESSMENT & PLAN NOTE
Controlled - a1c 7.0 6/23/2021 - will recheck today.   On farxiga 5mg po q day, prandin 2mg po three times a day, and januvia 100mg po q day - no change in meds.  ldl done 12/2020  bp at goal 6/23/2021   Statin goal - she is on lipitor 20 mg po q day.   She is a nonsmoker  She is on asa per day.   microalbumin ordered 6/23/2021    Eye exam done 1/25/2021  Foot exam done 6/23/2021   Ace/ arb for renal protection - on losartan  Dietician and follow up diabetes education offered.   Dm education said unable to reach her 6/30/2021 discussed.

## 2021-10-20 NOTE — ASSESSMENT & PLAN NOTE
Flu shot today.   Will rtc for bp recheck in 1 month and can get covid booster then.   Will need to schedule shingles vaccine after covid 3rd booster.   dexa ordered today.   mammo normal 9/2021  pneumo vax up dated.

## 2021-10-20 NOTE — PATIENT INSTRUCTIONS
Patient Education   Personalized Prevention Plan  You are due for the preventive services outlined below.  Your care team is available to assist you in scheduling these services.  If you have already completed any of these items, please share that information with your care team to update in your medical record.  Health Maintenance Due   Topic Date Due     Osteoporosis Screening  Never done     ANNUAL REVIEW OF HM ORDERS  Never done     Zoster (Shingles) Vaccine (2 of 3) 07/03/2015     Flu Vaccine (1) 09/01/2021       Understanding USDA MyPlate  The USDA has guidelines to help you make healthy food choices. These are called MyPlate. MyPlate shows the food groups that make up healthy meals using the image of a place setting. Before you eat, think about the healthiest choices for what to put on your plate or in your cup or bowl. To learn more about building a healthy plate, visit www.choosemyplate.gov.    The food groups    Fruits. Any fruit or 100% fruit juice counts as part of the Fruit Group. Fruits may be fresh, canned, frozen, or dried, and may be whole, cut-up, or pureed. Make 1/2 of your plate fruits and vegetables.    Vegetables. Any vegetable or 100% vegetable juice counts as a member of the Vegetable Group. Vegetables may be fresh, frozen, canned, or dried. They can be served raw or cooked and may be whole, cut-up, or mashed. Make 1/2 of your plate fruits and vegetables.    Grains. All foods made from grains are part of the Grains Group. These include wheat, rice, oats, cornmeal, and barley. Grains are often used to make foods such as bread, pasta, oatmeal, cereal, tortillas, and grits. Grains should be no more than 1/4 of your plate. At least half of your grains should be whole grains.    Protein. This group includes meat, poultry, seafood, beans and peas, eggs, processed soy products (such as tofu), nuts (including nut butters), and seeds. Make protein choices no more than 1/4 of your plate. Meat and  poultry choices should be lean or low fat.    Dairy. The Dairy Group includes all fluid milk products and foods made from milk that contain calcium, such as yogurt and cheese. (Foods that have little calcium, such as cream, butter, and cream cheese, are not part of this group.) Most dairy choices should be low-fat or fat-free.    Oils. Oils aren't a food group, but they do contain essential nutrients. However it's important to watch your intake of oils. These are fats that are liquid at room temperature. They include canola, corn, olive, soybean, vegetable, and sunflower oil. Foods that are mainly oil include mayonnaise, certain salad dressings, and soft margarines. You likely already get your daily oil allowance from the foods you eat.  Things to limit  Eating healthy also means limiting these things in your diet:       Salt (sodium). Many processed foods have a lot of sodium. To keep sodium intake down, eat fresh vegetables, meats, poultry, and seafood when possible. Purchase low-sodium, reduced-sodium, or no-salt-added food products at the store. And don't add salt to your meals at home. Instead, season them with herbs and spices such as dill, oregano, cumin, and paprika. Or try adding flavor with lemon or lime zest and juice.    Saturated fat. Saturated fats are most often found in animal products such as beef, pork, and chicken. They are often solid at room temperature, such as butter. To reduce your saturated fat intake, choose leaner cuts of meat and poultry. And try healthier cooking methods such as grilling, broiling, roasting, or baking. For a simple lower-fat swap, use plain nonfat yogurt instead of mayonnaise when making potato salad or macaroni salad.    Added sugars. These are sugars added to foods. They are in foods such as ice cream, candy, soda, fruit drinks, sports drinks, energy drinks, cookies, pastries, jams, and syrups. Cut down on added sugars by sharing sweet treats with a family member or  friend. You can also choose fruit for dessert, and drink water or other unsweetened beverages.     [employer] last reviewed this educational content on 6/1/2020 2000-2021 [employer]. All rights reserved. This information is not intended as a substitute for professional medical care. Always follow your healthcare professional's instructions.          Urinary Incontinence, Female (Adult)   Urinary incontinence means loss of bladder control. This problem affects many women, especially as they get older. If you have incontinence, you may be embarrassed to ask for help. But know that this problem can be treated.   Types of Incontinence  There are different types of incontinence. Two of the main types are described here. You can have more than one type.     Stress incontinence. With this type, urine leaks when pressure (stress) is put on the bladder. This may happen when you cough, sneeze, or laugh. Stress incontinence most often occurs because the pelvic floor muscles that support the bladder and urethra are weak. This can happen after pregnancy and vaginal childbirth or a hysterectomy. It can also be due to excess body weight or hormone changes.    Urge incontinence (also called overactive bladder). With this type, a sudden urge to urinate is felt often. This may happen even though there may not be much urine in the bladder. The need to urinate often during the night is common. Urge incontinence most often occurs because of bladder spasms. This may be due to bladder irritation or infection. Damage to bladder nerves or pelvic muscles, constipation, and certain medicines can also lead to urge incontinence.  Treatment depends on the cause. Further evaluation is needed to find the type you have. This will likely include an exam and certain tests. Based on the results, you and your healthcare provider can then plan treatment. Until a diagnosis is made, the home care tips below can help ease symptoms.   Home  care    Do pelvic floor muscle exercises, if they are prescribed. The pelvic floor muscles help support the bladder and urethra. Many women find that their symptoms improve when doing special exercises that strengthen these muscles. To do the exercises, contract the muscles you would use to stop your stream of urine. But do this when you re not urinating. Hold for 10 seconds, then relax. Repeat 10 to 20 times in a row, at least 3 times a day. Your healthcare provider may give you other instructions for how to do the exercises and how often.    Keep a bladder diary. This helps track how often and how much you urinate over a set period of time. Bring this diary with you to your next visit with the provider. The information can help your provider learn more about your bladder problem.    Lose weight, if advised to by your provider. Extra weight puts pressure on the bladder. Your provider can help you create a weight-loss plan that s right for you. This may include exercising more and making certain diet changes.    Don't have foods and drinks that may irritate the bladder. These can include alcohol and caffeinated drinks.    Quit smoking. Smoking and other tobacco use can lead to a long-term (chronic) cough that strains the pelvic floor muscles. Smoking may also damage the bladder and urethra. Talk with your provider about treatments or methods you can use to quit smoking.    If drinking large amounts of fluid makes you have symptoms, you may be advised to limit your fluid intake. You may also be advised to drink most of your fluids during the day and to limit fluids at night.    If you re worried about urine leakage or accidents, you may wear absorbent pads to catch urine. Change the pads often. This helps reduce discomfort. It may also reduce the risk of skin or bladder infections.    Follow-up care  Follow up with your healthcare provider, or as directed. It may take some to find the right treatment for your  problem. But healthy lifestyle changes can be made right away. These include such things as exercising on a regular basis, eating a healthy diet, losing weight (if needed), and quitting smoking. Your treatment plan may include special therapies or medicines. Certain procedures or surgery may also be options. Talk about any questions you have with your provider.   When to seek medical advice  Call the healthcare provider right away if any of these occur:    Fever of 100.4 F (38 C) or higher, or as directed by your provider    Bladder pain or fullness    Belly swelling    Nausea or vomiting    Back pain    Weakness, dizziness, or fainting  Luiz last reviewed this educational content on 1/1/2020 2000-2021 [employer]. All rights reserved. This information is not intended as a substitute for professional medical care. Always follow your healthcare professional's instructions.          Preventing Falls at Home  A person can fall for many reasons. Older adults may fall because reaction time slows down as we age. Your muscles and joints may get stiff, weak, or less flexible because of illness, medicines, or a physical condition.   Other health problems that make falls more likely include:     Arthritis    Dizziness or lightheadedness when you stand up (orthostatic hypotension)    History of a stroke    Dizziness    Anemia    Certain medicines taken for mental illness or to control blood pressure.    Problems with balance or gait    Bladder or urinary problems    History of falling    Changes in vision (vision impairment)    Changes in thinking skills and memory (cognitive impairment)  Injuries from a fall can include serious injuries such as broken bones, dislocated joints, internal bleeding and cuts. Injuries like these can limit your independence.   Prevention tips  To help prevent falls and fall-related injuries, follow the tips below.    Floors  To make floors safer:     Put nonskid pads under area  rugs.    Remove small rugs.    Replace worn floor coverings.    Tack carpets firmly to each step on carpeted stairs. Put nonskid strips on the edges of uncarpeted stairs.    Keep floors and stairs free of clutter and cords.    Arrange furniture so there are clear pathways.    Clean up any spills right away.  Bathrooms    To make bathrooms safer:     Install grab bars in the tub or shower.    Apply nonskid strips or put a nonskid rubber mat in the tub or shower.    Sit on a bath chair to bathe.    Use bathmats with nonskid backing.  Lighting  To improve visibility in your home:      Keep a flashlight in each room. Or put a lamp next to the bed within easy reach.    Put nightlights in the bedrooms, hallways, kitchen, and bathrooms.    Make sure all stairways have good lighting.    Take your time when going up and down stairs.    Put handrails on both sides of stairs and in walkways for more support. To prevent injury to your wrist or arm, don t use handrails to pull yourself up.    Install grab bars to pull yourself up.    Move or rearrange items that you use often. This will make them easier to find or reach.    Look at your home to find any safety hazards. Especially look at doorways, walkways, and the driveway. Remove or repair any safety problems that you find.  Other changes to make    Look around to find any safety hazards. Look closely at doorways, walkways, and the driveway. Remove or repair any safety problems that you find.    Wear shoes that fit well.    Take your time when going up and down stairs.    Put handrails on both sides of stairs and in walkways for more support. To prevent injury to your wrist or arm, don t use handrails to pull yourself up.    Install grab bars wherever needed to pull yourself up.    Arrange items that you use often. This will make them easier to find or reach.    Luiz last reviewed this educational content on 3/1/2020    5578-1988 [employer]. All rights  reserved. This information is not intended as a substitute for professional medical care. Always follow your healthcare professional's instructions.

## 2021-10-20 NOTE — ASSESSMENT & PLAN NOTE
Recommend she see an internist as she is currently stable with all her medical problems, but due to extreme medical complexity I do want to consult an internist in her care as I want to ensue her treatment is optimized.

## 2021-10-20 NOTE — PROGRESS NOTES
"30 mins total clinic time was spent with this patient outside and  beyond the prevent with review of the medical record and with documentation.  This time was spent on the day of service.        SUBJECTIVE:   Lizabeth Callahan is a 66 year old female who presents for Preventive Visit.    Patient has been advised of split billing requirements and indicates understanding: Yes   Are you in the first 12 months of your Medicare coverage?  No    Musculoskeletal Problem    Healthy Habits:     In general, how would you rate your overall health?  Excellent    Frequency of exercise:  4-5 days/week    Duration of exercise:  15-30 minutes    Do you usually eat at least 4 servings of fruit and vegetables a day, include whole grains    & fiber and avoid regularly eating high fat or \"junk\" foods?  No    Taking medications regularly:  Yes    Medication side effects:  Muscle aches and Lightheadedness    Ability to successfully perform activities of daily living:  No assistance needed    Home Safety:  Throw rugs in the hallway    Hearing Impairment:  No hearing concerns    In the past 6 months, have you been bothered by leaking of urine? Yes    In general, how would you rate your overall mental or emotional health?  Excellent      PHQ-2 Total Score: 0    Additional concerns today:  No    Do you feel safe in your environment? Yes    Have you ever done Advance Care Planning? (For example, a Health Directive, POLST, or a discussion with a medical provider or your loved ones about your wishes): Yes, patient states has an Advance Care Planning document and will bring a copy to the clinic.       Fall risk  Fallen 2 or more times in the past year?: Yes  Any fall with injury in the past year?: Yes  Timed Up and Go Test (>13.5 is fall risk; contact physician) : 9    Cognitive Screening   1) Repeat 3 items (Leader, Season, Table)  normal  2) Clock draw: NORMAL  3) 3 item recall: Recalls 3 objects  Results: 3 items recalled: COGNITIVE IMPAIRMENT " LESS LIKELY    Mini-CogTM Copyright JOSÉ MIGUEL Monreal. Licensed by the author for use in [facility]; reprinted with permission (gris@.[facility]). All rights reserved.        Reviewed and updated as needed this visit by clinical staff  Tobacco  Allergies  Meds  Problems  Med Hx  Surg Hx  Fam Hx  Soc Hx          Reviewed and updated as needed this visit by Provider  Tobacco  Allergies  Meds  Problems  Med Hx  Surg Hx  Fam Hx  Soc Hx         Social History     Tobacco Use     Smoking status: Never Smoker     Smokeless tobacco: Never Used   Substance Use Topics     Alcohol use: Yes     Comment: Alcoholic Drinks/day: rarely     If you drink alcohol do you typically have >3 drinks per day or >7 drinks per week? No    Alcohol Use 10/20/2021   Prescreen: >3 drinks/day or >7 drinks/week? No   Prescreen: >3 drinks/day or >7 drinks/week? -       Current providers sharing in care for this patient include:   Patient Care Team:  Suzy Gabriel MD as PCP - General (Family Practice)  Suzy Gabriel MD as Assigned PCP  Mark Shaver MD as Assigned Heart and Vascular Provider    The following health maintenance items are reviewed in Epic and correct as of today:  Health Maintenance Due   Topic Date Due     DEXA  Never done     ANNUAL REVIEW OF HM ORDERS  Never done     ZOSTER IMMUNIZATION (2 of 3) 07/03/2015     INFLUENZA VACCINE (1) 09/01/2021       Pneumonia Vaccine: up to date  Mammogram Screening: nl mammo noted 9/2021  Last 3 Pap and HPV Results:    Any new diagnosis of family breast, ovarian, or bowel cancer? No    FHS-7:   Breast CA Risk Assessment (FHS-7) 9/7/2021   Did any of your first-degree relatives have breast or ovarian cancer? No   Did any of your relatives have bilateral breast cancer? No   Did any man in your family have breast cancer? No   Did any woman in your family have breast and ovarian cancer? No   Did any woman in your family have breast cancer before age 50 y? No   Do you have 2 or more  "relatives with breast and/or ovarian cancer? No   Do you have 2 or more relatives with breast and/or bowel cancer? No       Mammogram Screening: Recommended mammography every 1-2 years with patient discussion and risk factor consideration  Pertinent mammograms are reviewed under the imaging tab.    Review of Systems      OBJECTIVE:   BP (!) 150/82  Estimated body mass index is 29.23 kg/m  as calculated from the following:    Height as of 7/28/21: 1.6 m (5' 3\").    Weight as of 7/28/21: 74.8 kg (165 lb).  Physical Exam  Constitutional:       Appearance: Normal appearance.   HENT:      Head: Normocephalic and atraumatic.   Cardiovascular:      Rate and Rhythm: Normal rate and regular rhythm.      Heart sounds: Normal heart sounds.   Pulmonary:      Effort: Pulmonary effort is normal.      Breath sounds: Normal breath sounds.   Musculoskeletal:         General: Normal range of motion.      Cervical back: Normal range of motion and neck supple.   Neurological:      General: No focal deficit present.      Mental Status: She is alert and oriented to person, place, and time.               ASSESSMENT / PLAN:     Problem List Items Addressed This Visit        Nervous and Auditory    Arthralgias In Multiple Sites     cymbalta 20mg po q hs for pain per patient helps. Refilled         Relevant Medications    gabapentin (NEURONTIN) 300 MG capsule    DULoxetine (CYMBALTA) 20 MG capsule    Other Relevant Orders    Anti Nuclear Corina IgG by IFA with Reflex    CRP inflammation    Rheumatoid factor    Erythrocyte sedimentation rate auto    Cyclic Citrullinated Peptide Antibody IgG    Internal Medicine Referral    Rheumatology Referral    Foot Pain (Soft Tissue)     Gabapentin 600mg po q hs controls this. Refilled.          Relevant Medications    gabapentin (NEURONTIN) 300 MG capsule    Other Relevant Orders    Internal Medicine Referral       Respiratory    RESOLVED: Acute Sinusitis     Resolved         Relevant Orders    Internal " Medicine Referral       Digestive    Gastroesophageal reflux disease with esophagitis     Controlled on omeprazole 20 mg po q day. Benign egd in past. Refilled.          Relevant Medications    omeprazole (PRILOSEC) 20 MG DR capsule    Other Relevant Orders    Internal Medicine Referral       Endocrine    Diabetes mellitus (H)     Controlled - a1c 7.0 6/23/2021 - will recheck today.   On farxiga 5mg po q day, prandin 2mg po three times a day, and januvia 100mg po q day - no change in meds.  ldl done 12/2020  bp at goal 6/23/2021   Statin goal - she is on lipitor 20 mg po q day.   She is a nonsmoker  She is on asa per day.   microalbumin ordered 6/23/2021    Eye exam done 1/25/2021  Foot exam done 6/23/2021   Ace/ arb for renal protection - on losartan  Dietician and follow up diabetes education offered.   Dm education said unable to reach her 6/30/2021 discussed.          Relevant Medications    dapagliflozin (FARXIGA) 5 MG TABS tablet    repaglinide (PRANDIN) 2 MG tablet    sitagliptin (JANUVIA) 100 MG tablet    Other Relevant Orders    Hemoglobin A1c    Internal Medicine Referral    Mixed hyperlipidemia     Refilled lipitor 20 mg po q day. refilled - will check labs to monitor.          Relevant Medications    atorvastatin (LIPITOR) 20 MG tablet    Other Relevant Orders    Lipid Profile    Internal Medicine Referral    Hypokalemia    Relevant Medications    potassium chloride ER (KLOR-CON M) 20 MEQ CR tablet    Other Relevant Orders    Comprehensive metabolic panel (BMP + Alb, Alk Phos, ALT, AST, Total. Bili, TP)    Internal Medicine Referral       Circulatory    Hypertension    Relevant Medications    losartan (COZAAR) 100 MG tablet    hydrochlorothiazide (HYDRODIURIL) 25 MG tablet    Other Relevant Orders    Comprehensive metabolic panel (BMP + Alb, Alk Phos, ALT, AST, Total. Bili, TP)    Internal Medicine Referral       Other    Encounter for preventive care     Flu shot today.   Will rtc for bp recheck in 1  "month and can get covid booster then.   Will need to schedule shingles vaccine after covid 3rd booster.   dexa ordered today.   mammo normal 9/2021  pneumo vax up dated.          Medically complex patient     Recommend she see an internist as she is currently stable with all her medical problems, but due to extreme medical complexity I do want to consult an internist in her care as I want to ensue her treatment is optimized.          Fall     She tripped over a sidewalk. And the second time she tripped over a garden fork which was stuck in the grass.     We discussed cane or other aid and she declined this. Declined any help (physical therapy or in home fall assessment) with falls at this time.            Other Visit Diagnoses     Screening for osteoporosis    -  Primary    Relevant Orders    DX Hip/Pelvis/Spine    Internal Medicine Referral    Screening, anemia, deficiency, iron        Relevant Orders    CBC with platelets    Internal Medicine Referral    Type 2 diabetes mellitus with complication, without long-term current use of insulin (H)        Relevant Medications    dapagliflozin (FARXIGA) 5 MG TABS tablet    repaglinide (PRANDIN) 2 MG tablet    sitagliptin (JANUVIA) 100 MG tablet    Other Relevant Orders    Internal Medicine Referral    Essential hypertension        Relevant Medications    losartan (COZAAR) 100 MG tablet    hydrochlorothiazide (HYDRODIURIL) 25 MG tablet    Other Relevant Orders    Internal Medicine Referral    Encounter for Medicare annual wellness exam              Patient has been advised of split billing requirements and indicates understanding: Yes  COUNSELING:  Reviewed preventive health counseling, as reflected in patient instructions       Regular exercise       Healthy diet/nutrition    Estimated body mass index is 29.23 kg/m  as calculated from the following:    Height as of 7/28/21: 1.6 m (5' 3\").    Weight as of 7/28/21: 74.8 kg (165 lb).      She reports that she has never " smoked. She has never used smokeless tobacco.      Appropriate preventive services were discussed with this patient, including applicable screening as appropriate for cardiovascular disease, diabetes, osteopenia/osteoporosis, and glaucoma.  As appropriate for age/gender, discussed screening for colorectal cancer, prostate cancer, breast cancer, and cervical cancer. Checklist reviewing preventive services available has been given to the patient.    Reviewed patients plan of care and provided an AVS. The Basic Care Plan (routine screening as documented in Health Maintenance) for Lizabeth meets the Care Plan requirement. This Care Plan has been established and reviewed with the Patient.    Suzy Gabriel MD  [facility]    Identified Health Risks:    The patient was counseled and encouraged to consider modifying their diet and eating habits. She was provided with information on recommended healthy diet options.  Information on urinary incontinence and treatment options given to patient.  She is at risk for falling and has been provided with information to reduce the risk of falling at home.

## 2021-10-21 LAB — CCP AB SER IA-ACNC: 1.2 U/ML

## 2021-10-22 LAB — ANA SER QL IF: NEGATIVE

## 2021-10-25 ENCOUNTER — TELEPHONE (OUTPATIENT)
Dept: FAMILY MEDICINE | Facility: CLINIC | Age: 66
End: 2021-10-25

## 2021-10-27 ENCOUNTER — OFFICE VISIT (OUTPATIENT)
Dept: INTERNAL MEDICINE | Facility: CLINIC | Age: 66
End: 2021-10-27
Payer: MEDICARE

## 2021-10-27 VITALS
DIASTOLIC BLOOD PRESSURE: 64 MMHG | BODY MASS INDEX: 29.41 KG/M2 | SYSTOLIC BLOOD PRESSURE: 136 MMHG | HEART RATE: 81 BPM | WEIGHT: 166 LBS | HEIGHT: 63 IN | OXYGEN SATURATION: 95 %

## 2021-10-27 DIAGNOSIS — E66.3 OVERWEIGHT (BMI 25.0-29.9): ICD-10-CM

## 2021-10-27 DIAGNOSIS — E11.65 TYPE 2 DIABETES MELLITUS WITH HYPERGLYCEMIA, WITHOUT LONG-TERM CURRENT USE OF INSULIN (H): ICD-10-CM

## 2021-10-27 DIAGNOSIS — M25.50 MULTIPLE JOINT PAIN: ICD-10-CM

## 2021-10-27 DIAGNOSIS — E78.2 MIXED HYPERLIPIDEMIA: ICD-10-CM

## 2021-10-27 DIAGNOSIS — K21.00 GASTROESOPHAGEAL REFLUX DISEASE WITH ESOPHAGITIS WITHOUT HEMORRHAGE: ICD-10-CM

## 2021-10-27 DIAGNOSIS — I10 PRIMARY HYPERTENSION: ICD-10-CM

## 2021-10-27 PROCEDURE — 99203 OFFICE O/P NEW LOW 30 MIN: CPT | Performed by: NURSE PRACTITIONER

## 2021-10-27 RX ORDER — LISINOPRIL 20 MG/1
40 TABLET ORAL DAILY
Qty: 90 TABLET | Refills: 0 | Status: SHIPPED | OUTPATIENT
Start: 2021-10-27 | End: 2021-10-27

## 2021-10-27 RX ORDER — GABAPENTIN 300 MG/1
900 CAPSULE ORAL AT BEDTIME
Qty: 270 CAPSULE | Refills: 3 | Status: SHIPPED | OUTPATIENT
Start: 2021-10-27

## 2021-10-27 RX ORDER — SEMAGLUTIDE 1.34 MG/ML
1 INJECTION, SOLUTION SUBCUTANEOUS
Qty: 12 ML | Refills: 1 | Status: SHIPPED | OUTPATIENT
Start: 2021-12-22 | End: 2021-10-29

## 2021-10-27 RX ORDER — LISINOPRIL 40 MG/1
40 TABLET ORAL DAILY
Qty: 90 TABLET | Refills: 3 | Status: SHIPPED | OUTPATIENT
Start: 2021-10-27 | End: 2021-10-29

## 2021-10-27 RX ORDER — SEMAGLUTIDE 1.34 MG/ML
INJECTION, SOLUTION SUBCUTANEOUS
Qty: 3 MG | Refills: 0 | Status: SHIPPED | OUTPATIENT
Start: 2021-10-27 | End: 2021-11-17

## 2021-10-27 ASSESSMENT — MIFFLIN-ST. JEOR: SCORE: 1244.16

## 2021-10-27 NOTE — PROGRESS NOTES
Clinic Note    Assessment:     Assessment and Plan:  1. Multiple joint pain: Recent autoimmune workup was negative. She continues on Gabapentin, will increase dose to 900mg at bedtime. She continues on Cymbalta as well. Will refer to Rheumatology. She does report being on methotrexate in the past, and this was helpful for pain control.   - Rheumatology Referral; Future    2. Type 2 diabetes mellitus with hyperglycemia, without long-term current use of insulin (H): Most recent A1C was 7.4%. She continues on Prandin, Januvia, Farxifa. Will start on weekly Ozempic. Continue to work on diet and exercise.  - insulin pen needle (32G X 4 MM) 32G X 4 MM miscellaneous; Use 1 pen needles daily or as directed.  Dispense: 100 each; Refill: 3  - semaglutide (OZEMPIC, 0.25 OR 0.5 MG/DOSE,) 2 MG/1.5ML SOPN pen; Inject 0.25 mg Subcutaneous every 7 days for 28 days, THEN 0.5 mg every 7 days for 28 days.  Dispense: 3 mg; Refill: 0  - Semaglutide, 1 MG/DOSE, (OZEMPIC, 1 MG/DOSE,) 4 MG/3ML SOPN; Inject 1 mg Subcutaneous every 7 days  Dispense: 12 mL; Refill: 1  - Diabetic diet.  - Continue to exercise.     3. Primary hypertension: Dry cough on Losartan. Will switch to Lisinopril. Blood pressure today in office was 136/64.  - lisinopril (ZESTRIL) 40 MG tablet; Take 1 tablet (40 mg) by mouth daily  Dispense: 90 tablet; Refill: 3    4. Gastroesophageal reflux disease with esophagitis without hemorrhage: hx of this. She continues on Omeprazole. Stable.     5. Mixed hyperlipidemia: She continues on atorvastatin. Stable.     6. Overweight (BMI 25.0-29.9): BMI today was 29.88. Discussed diabetic diet and exercise.        Patient Instructions   Continue your current diabetes medications as prescribed.    Start the Ozempic weekly dosage.  Take Ozempic 0.25 mg weekly for 4 doses, then increase to 0.5 mg weekly for 4 doses, then increase to 1 mg weekly.    Continue checking your blood sugars as you have been.    Increase your gabapentin to 900 mg  (3-300 mg capsules) at bedtime.    Stop the losartan, start the lisinopril.  This should help with your cough.  Goals for your blood pressure are under 140/90, greater than 100/60.    I have referred you to see a rheumatologist.  Call the number below to get scheduled.    I would like to see you back in a month for follow-up, to recheck your kidney function and liver function, and to see how you are tolerating the ozempic.       Return in about 1 month (around 11/27/2021) for Follow up.         Subjective:      Lizabeth Callahan is a 66 year old female presents today to establish care.    She usually sees Dr. Gabriel at the [facility] as her PCP.    She recommended that she see an internist. She is unsure if she will stay here with us, or go back to [facility] at this point.    She reports a past surgical history of three back surgeries, last was 20 years ago. She reports a total hysterectomy, and a left sided carpal tunnel release. Hx of ganglion cyst removal in the past of the right wrist.    She reports her Mom passed, had lung cancer, was a smoker. Dad passed of non-hodgkins lymphoma.     She denies any tobacco or drug use, she rarely drinks alcohol.    She reports that she has been working a lot outside, clearing out brush and trees with her , they hope to put up a shed this next month.    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    She is  with 2 sons, 2 daughters, and 8 grandkids, all are doing well.    She reports continued multiple joint pain. She continues on Gabapentin and Cymbalta for this, she would like to try to increase her current gabapentin dosing.    She continues on Januvia, prandin, Farxiga for her diabetes. Most recent A1C was 7.4%. She is open to trying an injectable medication, discussed ozempic with her today.    She also reports a chronic dry cough, she thinks from her medication. Will try  "switching to Lisinopril.    The following portions of the patient's history were reviewed and updated as appropriate.    Review of Systems:    Review is otherwise negative except for what is mentioned above.     Social Hx:    History   Smoking Status     Never Smoker   Smokeless Tobacco     Never Used         Objective:     Vitals:    10/27/21 1409   BP: 136/64   BP Location: Right arm   Patient Position: Sitting   Cuff Size: Adult Regular   Pulse: 81   SpO2: 95%   Weight: 75.3 kg (166 lb)   Height: 1.588 m (5' 2.5\")       Exam:  General: No apparent distress. Calm. Alert and Oriented X3. Pt behavior is appropriate.  Head:Atraumatic. Normocephalic.  Neck: Supple. No adenopathy  Eyes: PERRLA, No discharge. No strabismus. No nystagmus.  Ears: TMs pearly gray with landmarks visible.   Nose/Mouth/Throat: Patent nares, no oral lesions, pharynx clear and without exudate. Uvula mid-line. Nasal septum mid-line. Clear turbinates.   Chest/Lungs: Normal chest wall, clear to auscultation, normal respiratory effort and rate.   Heart/Pulses: Regular rate and rhythm, no murmurs, gallops, or rubs. Capillary refill <2 seconds. No edema.   Musculoskeletal: Walks without difficulty.   Neurologic: Interactive, alert, no focal findings.  Skin: Warm, dry.    Patient Active Problem List   Diagnosis     Right Trapezoid Muscle Strain     Osteoarthritis Of The Knee     Seborrheic Keratosis     Diabetes mellitus (H)     Mixed hyperlipidemia     Arthralgias In Multiple Sites     Myalgia And Myositis     Rheumatoid arthritis (H)     Generalized Osteoarthritis Of The Hand     Localized Primary Osteoarthritis Of The Carpometacarpal Joint     Calcaneal Spur     Foot Pain (Soft Tissue)     Joint Pain, Localized In The Knee     Hx pulmonary embolism     Chest pain, unspecified type     Hypertension     Left leg pain     Leg edema, left     Anticoagulation goal of INR 2 to 3     Abnormal findings on diagnostic imaging of liver and biliary tract     " Diverticulosis of colon     Hydronephrosis     Insomnia     Undiagnosed cardiac murmurs     Gastroesophageal reflux disease with esophagitis     Headache     Diverticulosis     Encounter for preventive care     Post-void dribbling     Cramp of limb     Dysphagia     History of DVT (deep vein thrombosis)     Hypokalemia     Medically complex patient     Fall     Current Outpatient Medications   Medication Sig Dispense Refill     aspirin 81 MG EC tablet [ASPIRIN 81 MG EC TABLET] Take 81 mg by mouth daily.       atorvastatin (LIPITOR) 20 MG tablet Take 1 tablet (20 mg) by mouth daily 90 tablet 3     blood glucose test (UNISTRIP1 TEST STRIP) strips [BLOOD GLUCOSE TEST (UNISTRIP1 TEST STRIP) STRIPS] TEST BLOOD SUGARS ONE TIME DAILY 1 strip PRN     blood sugar diagnostic (GLUCOSE BLOOD) Strp [BLOOD SUGAR DIAGNOSTIC (GLUCOSE BLOOD) STRP] Patient Test One Time Daily-UNISTRIP TEST STRIPS AND LANCETS. DX: Type 2  strip 3     cinnamon bark (CINNAMON) 500 mg capsule [CINNAMON BARK (CINNAMON) 500 MG CAPSULE] Take 500 mg by mouth daily.       dapagliflozin (FARXIGA) 5 MG TABS tablet Take 1 tablet (5 mg) by mouth daily 90 tablet 0     DULoxetine (CYMBALTA) 20 MG capsule Take 1 capsule (20 mg) by mouth At Bedtime 90 capsule 0     gabapentin (NEURONTIN) 300 MG capsule Take 3 capsules (900 mg) by mouth At Bedtime 270 capsule 3     hydrochlorothiazide (HYDRODIURIL) 25 MG tablet Take 1 tablet (25 mg) by mouth daily 90 tablet 0     insulin pen needle (32G X 4 MM) 32G X 4 MM miscellaneous Use 1 pen needles daily or as directed. 100 each 3     lisinopril (ZESTRIL) 40 MG tablet Take 1 tablet (40 mg) by mouth daily 90 tablet 3     multivit with minerals/lutein (MULTIVITAMIN 50 PLUS ORAL) [MULTIVIT WITH MINERALS/LUTEIN (MULTIVITAMIN 50 PLUS ORAL)] multivitamin   once daily       nystatin (MYCOSTATIN) 200135 UNIT/GM external powder Apply topically 3 times daily as needed 15 g 3     omeprazole (PRILOSEC) 20 MG DR capsule Take 1 capsule  (20 mg) by mouth every morning 90 capsule 3     potassium chloride ER (KLOR-CON M) 20 MEQ CR tablet Take 1 tablet (20 mEq) by mouth 2 times daily 180 tablet 3     repaglinide (PRANDIN) 2 MG tablet Take 1 tablet (2 mg) by mouth 3 times daily (before meals) 270 tablet 0     semaglutide (OZEMPIC, 0.25 OR 0.5 MG/DOSE,) 2 MG/1.5ML SOPN pen Inject 0.25 mg Subcutaneous every 7 days for 28 days, THEN 0.5 mg every 7 days for 28 days. 3 mg 0     [START ON 12/22/2021] Semaglutide, 1 MG/DOSE, (OZEMPIC, 1 MG/DOSE,) 4 MG/3ML SOPN Inject 1 mg Subcutaneous every 7 days 12 mL 1     sitagliptin (JANUVIA) 100 MG tablet Take 1 tablet (100 mg) by mouth daily 90 tablet 0     Madeline Ortega, Adult-Geriatric Nurse Practitioner  [facility] - Internal Medicine Team     10/27/2021

## 2021-10-27 NOTE — PATIENT INSTRUCTIONS
Continue your current diabetes medications as prescribed.    Start the Ozempic weekly dosage.  Take Ozempic 0.25 mg weekly for 4 doses, then increase to 0.5 mg weekly for 4 doses, then increase to 1 mg weekly.    Continue checking your blood sugars as you have been.    Increase your gabapentin to 900 mg (3-300 mg capsules) at bedtime.    Stop the losartan, start the lisinopril.  This should help with your cough.  Goals for your blood pressure are under 140/90, greater than 100/60.    I have referred you to see a rheumatologist.  Call the number below to get scheduled.    I would like to see you back in a month for follow-up, to recheck your kidney function and liver function, and to see how you are tolerating the ozempic.

## 2021-10-28 ENCOUNTER — TELEPHONE (OUTPATIENT)
Dept: FAMILY MEDICINE | Facility: CLINIC | Age: 66
End: 2021-10-28

## 2021-10-28 DIAGNOSIS — E11.65 TYPE 2 DIABETES MELLITUS WITH HYPERGLYCEMIA, WITHOUT LONG-TERM CURRENT USE OF INSULIN (H): ICD-10-CM

## 2021-10-28 DIAGNOSIS — I10 PRIMARY HYPERTENSION: ICD-10-CM

## 2021-10-28 DIAGNOSIS — R21 RASH: ICD-10-CM

## 2021-10-28 NOTE — TELEPHONE ENCOUNTER
Reason for Call:  Other prescription    Detailed comments: Pt called and said that her meds that was prescribed yesterday:     Semaglutide, 1 MG/DOSE, (OZEMPIC, 1 MG/DOSE,) 4 MG/3ML SOPN    lisinopril (ZESTRIL) 40 MG tablet    Needs to be sent to: [facility] - [address] [facility]  [phone]    She is also requesting a medication refill on: nystatin (MYCOSTATIN) 823387 UNIT/GM external powder    Phone Number Patient can be reached at: Cell number on file:    Telephone Information:   Mobile [phone]       Best Time: ANY    Can we leave a detailed message on this number? YES    Call taken on 10/28/2021 at 11:04 AM by Rafiq Hanson

## 2021-10-29 RX ORDER — LISINOPRIL 40 MG/1
40 TABLET ORAL DAILY
Qty: 90 TABLET | Refills: 3 | Status: SHIPPED | OUTPATIENT
Start: 2021-10-29

## 2021-10-29 RX ORDER — SEMAGLUTIDE 1.34 MG/ML
1 INJECTION, SOLUTION SUBCUTANEOUS
Qty: 12 ML | Refills: 1 | Status: SHIPPED | OUTPATIENT
Start: 2021-12-22

## 2021-11-08 ENCOUNTER — MEDICAL CORRESPONDENCE (OUTPATIENT)
Dept: HEALTH INFORMATION MANAGEMENT | Facility: CLINIC | Age: 66
End: 2021-11-08
Payer: MEDICARE

## 2021-11-15 DIAGNOSIS — I10 PRIMARY HYPERTENSION: ICD-10-CM

## 2021-11-15 DIAGNOSIS — I10 ESSENTIAL HYPERTENSION: ICD-10-CM

## 2021-11-15 RX ORDER — HYDROCHLOROTHIAZIDE 25 MG/1
25 TABLET ORAL DAILY
Qty: 90 TABLET | Refills: 0 | Status: SHIPPED | OUTPATIENT
Start: 2021-11-15

## 2021-11-16 DIAGNOSIS — E11.65 TYPE 2 DIABETES MELLITUS WITH HYPERGLYCEMIA, WITHOUT LONG-TERM CURRENT USE OF INSULIN (H): ICD-10-CM

## 2021-11-16 NOTE — TELEPHONE ENCOUNTER
Reason for Call:  Medication or medication refill:    Do you use a [facility]?  Name of the pharmacy and phone number for the current request:  [facility]    Name of the medication requested: semaglutide (OZEMPIC, 0.25 OR 0.5 MG/DOSE,) 2 MG/1.5ML SOPN pen    Can we leave a detailed message on this number? YES    Phone number patient can be reached at: Cell number on file:    Telephone Information:   Mobile [phone]       Best Time:anytime    Call taken on 11/16/2021 at 10:08 AM by Bonnie Tony

## 2021-11-17 RX ORDER — SEMAGLUTIDE 1.34 MG/ML
1 INJECTION, SOLUTION SUBCUTANEOUS
Qty: 4 MG | Refills: 0 | Status: SHIPPED | OUTPATIENT
Start: 2021-11-17 | End: 2021-12-06

## 2021-12-06 ENCOUNTER — OFFICE VISIT (OUTPATIENT)
Dept: INTERNAL MEDICINE | Facility: CLINIC | Age: 66
End: 2021-12-06
Payer: MEDICARE

## 2021-12-06 VITALS
HEIGHT: 63 IN | HEART RATE: 77 BPM | WEIGHT: 168 LBS | BODY MASS INDEX: 29.77 KG/M2 | DIASTOLIC BLOOD PRESSURE: 64 MMHG | SYSTOLIC BLOOD PRESSURE: 128 MMHG | OXYGEN SATURATION: 95 %

## 2021-12-06 DIAGNOSIS — E66.09 CLASS 1 OBESITY DUE TO EXCESS CALORIES WITH SERIOUS COMORBIDITY AND BODY MASS INDEX (BMI) OF 30.0 TO 30.9 IN ADULT: ICD-10-CM

## 2021-12-06 DIAGNOSIS — K21.00 GASTROESOPHAGEAL REFLUX DISEASE WITH ESOPHAGITIS WITHOUT HEMORRHAGE: ICD-10-CM

## 2021-12-06 DIAGNOSIS — E11.65 TYPE 2 DIABETES MELLITUS WITH HYPERGLYCEMIA, WITHOUT LONG-TERM CURRENT USE OF INSULIN (H): ICD-10-CM

## 2021-12-06 DIAGNOSIS — R21 RASH AND NONSPECIFIC SKIN ERUPTION: ICD-10-CM

## 2021-12-06 DIAGNOSIS — E78.2 MIXED HYPERLIPIDEMIA: ICD-10-CM

## 2021-12-06 DIAGNOSIS — E66.811 CLASS 1 OBESITY DUE TO EXCESS CALORIES WITH SERIOUS COMORBIDITY AND BODY MASS INDEX (BMI) OF 30.0 TO 30.9 IN ADULT: ICD-10-CM

## 2021-12-06 DIAGNOSIS — I10 PRIMARY HYPERTENSION: ICD-10-CM

## 2021-12-06 LAB
ANION GAP SERPL CALCULATED.3IONS-SCNC: 10 MMOL/L (ref 5–18)
BUN SERPL-MCNC: 22 MG/DL (ref 8–22)
CALCIUM SERPL-MCNC: 10 MG/DL (ref 8.5–10.5)
CHLORIDE BLD-SCNC: 105 MMOL/L (ref 98–107)
CO2 SERPL-SCNC: 24 MMOL/L (ref 22–31)
CREAT SERPL-MCNC: 0.87 MG/DL (ref 0.6–1.1)
GFR SERPL CREATININE-BSD FRML MDRD: 69 ML/MIN/1.73M2
GLUCOSE BLD-MCNC: 96 MG/DL (ref 70–125)
HBA1C MFR BLD: 6.9 % (ref 0–5.6)
POTASSIUM BLD-SCNC: 4 MMOL/L (ref 3.5–5)
SODIUM SERPL-SCNC: 139 MMOL/L (ref 136–145)

## 2021-12-06 PROCEDURE — 80048 BASIC METABOLIC PNL TOTAL CA: CPT | Performed by: NURSE PRACTITIONER

## 2021-12-06 PROCEDURE — 83036 HEMOGLOBIN GLYCOSYLATED A1C: CPT | Performed by: NURSE PRACTITIONER

## 2021-12-06 PROCEDURE — 99214 OFFICE O/P EST MOD 30 MIN: CPT | Performed by: NURSE PRACTITIONER

## 2021-12-06 PROCEDURE — 36415 COLL VENOUS BLD VENIPUNCTURE: CPT | Performed by: NURSE PRACTITIONER

## 2021-12-06 ASSESSMENT — MIFFLIN-ST. JEOR: SCORE: 1253.23

## 2021-12-06 NOTE — PROGRESS NOTES
Clinic Note    Assessment:     Assessment and Plan:  1. Type 2 diabetes mellitus with hyperglycemia, without long-term current use of insulin (H): A1C is improved at 6.9%. Continue Ozempic. Continue diet and exercise. Three month follow up.   - Basic metabolic panel  (Ca, Cl, CO2, Creat, Gluc, K, Na, BUN); Future  - Hemoglobin A1c; Future    2. Rash and nonspecific skin eruption: Could be related to Ozempic, rash is not bothersome, discussed trying OTC hydrocortisone as needed. Monitor area closely, update provider if rash spreads or worsens.     3. Primary hypertension: Blood pressure today was 128/64. She continues lisinopril, hydrochlorothiazide. Stable.     4. Gastroesophageal reflux disease with esophagitis without hemorrhage: She continues on Omeprazole. Stable.     5. Mixed hyperlipidemia: She continues on Atorvastatin. Stable.     6. Class 1 obesity due to excess calories with serious comorbidity and body mass index (BMI) of 30.0 to 30.9 in adult: BMI today was 30.24. Discussed diet and exercise.        Patient Instructions   Continue the Ozempic.    Try over the counter hydrocortisone cream as needed for itching on your abdomen.     Your labs are processing, I will release results on my chart once they are back.    See you back in three months for a diabetes recheck, before then if anything comes up.    Start checking your blood sugar twice daily again for the next two weeks, send me readings via my chart.     Return in about 3 months (around 3/6/2022) for Follow up.         Subjective:      Lizabeth Callahan is a 66 year old female presents today for a diabetes follow up.    She is currently on Ozempic 0.5mg weekly, has two doses remaining at this dosing, then will increase up to 1mg weekly. She reports noticing any itchy rash that comes and goes on her abdomen since starting this medication. It is not very bothersome.    She continues to use Nystatin powder under her breasts and groin area as she is prone to  "fungal rashes there.     She reports that she has not been checking her blood sugar at home, encouraged her to restart this.    She denies other concerns today.     The following portions of the patient's history were reviewed and updated as appropriate.    Review of Systems:    Review is otherwise negative except for what is mentioned above.     Social Hx:    History   Smoking Status     Never Smoker   Smokeless Tobacco     Never Used         Objective:     Vitals:    12/06/21 0927   BP: 128/64   BP Location: Right arm   Patient Position: Sitting   Cuff Size: Adult Regular   Pulse: 77   SpO2: 95%   Weight: 76.2 kg (168 lb)   Height: 1.588 m (5' 2.5\")     Physical Exam  Vitals and nursing note reviewed.   Constitutional:       Appearance: Normal appearance. She is normal weight.   HENT:      Head: Normocephalic and atraumatic.   Cardiovascular:      Rate and Rhythm: Normal rate and regular rhythm.      Pulses: Normal pulses.      Heart sounds: Normal heart sounds. No murmur heard.  No friction rub. No gallop.    Pulmonary:      Effort: Pulmonary effort is normal.      Breath sounds: Normal breath sounds.   Skin:     General: Skin is warm and dry.      Capillary Refill: Capillary refill takes less than 2 seconds.      Findings: Rash present.          Neurological:      General: No focal deficit present.      Mental Status: She is alert and oriented to person, place, and time. Mental status is at baseline.   Psychiatric:         Mood and Affect: Mood normal.         Behavior: Behavior normal.         Thought Content: Thought content normal.         Judgment: Judgment normal.           Patient Active Problem List   Diagnosis     Right Trapezoid Muscle Strain     Osteoarthritis Of The Knee     Seborrheic Keratosis     Diabetes mellitus (H)     Mixed hyperlipidemia     Arthralgias In Multiple Sites     Myalgia And Myositis     Rheumatoid arthritis (H)     Generalized Osteoarthritis Of The Hand     Localized Primary " Osteoarthritis Of The Carpometacarpal Joint     Calcaneal Spur     Foot Pain (Soft Tissue)     Joint Pain, Localized In The Knee     Hx pulmonary embolism     Chest pain, unspecified type     Hypertension     Left leg pain     Leg edema, left     Anticoagulation goal of INR 2 to 3     Abnormal findings on diagnostic imaging of liver and biliary tract     Diverticulosis of colon     Hydronephrosis     Insomnia     Undiagnosed cardiac murmurs     Gastroesophageal reflux disease with esophagitis     Headache     Diverticulosis     Encounter for preventive care     Post-void dribbling     Cramp of limb     Dysphagia     History of DVT (deep vein thrombosis)     Hypokalemia     Medically complex patient     Fall     Current Outpatient Medications   Medication Sig Dispense Refill     aspirin 81 MG EC tablet [ASPIRIN 81 MG EC TABLET] Take 81 mg by mouth daily.       atorvastatin (LIPITOR) 20 MG tablet Take 1 tablet (20 mg) by mouth daily 90 tablet 3     blood glucose test (UNISTRIP1 TEST STRIP) strips [BLOOD GLUCOSE TEST (UNISTRIP1 TEST STRIP) STRIPS] TEST BLOOD SUGARS ONE TIME DAILY 1 strip PRN     blood sugar diagnostic (GLUCOSE BLOOD) Strp [BLOOD SUGAR DIAGNOSTIC (GLUCOSE BLOOD) STRP] Patient Test One Time Daily-UNISTRIP TEST STRIPS AND LANCETS. DX: Type 2  strip 3     cinnamon bark (CINNAMON) 500 mg capsule [CINNAMON BARK (CINNAMON) 500 MG CAPSULE] Take 500 mg by mouth daily.       dapagliflozin (FARXIGA) 5 MG TABS tablet Take 1 tablet (5 mg) by mouth daily 90 tablet 0     DULoxetine (CYMBALTA) 20 MG capsule Take 1 capsule (20 mg) by mouth At Bedtime 90 capsule 0     gabapentin (NEURONTIN) 300 MG capsule Take 3 capsules (900 mg) by mouth At Bedtime 270 capsule 3     hydrochlorothiazide (HYDRODIURIL) 25 MG tablet Take 1 tablet (25 mg) by mouth daily 90 tablet 0     insulin pen needle (32G X 4 MM) 32G X 4 MM miscellaneous Use 1 pen needles daily or as directed. 100 each 3     lisinopril (ZESTRIL) 40 MG tablet Take  1 tablet (40 mg) by mouth daily 90 tablet 3     multivit with minerals/lutein (MULTIVITAMIN 50 PLUS ORAL) [MULTIVIT WITH MINERALS/LUTEIN (MULTIVITAMIN 50 PLUS ORAL)] multivitamin   once daily       nystatin (MYCOSTATIN) 178198 UNIT/GM external powder Apply topically 3 times daily as needed 15 g 3     omeprazole (PRILOSEC) 20 MG DR capsule Take 1 capsule (20 mg) by mouth every morning 90 capsule 3     potassium chloride ER (KLOR-CON M) 20 MEQ CR tablet Take 1 tablet (20 mEq) by mouth 2 times daily 180 tablet 3     repaglinide (PRANDIN) 2 MG tablet Take 1 tablet (2 mg) by mouth 3 times daily (before meals) 270 tablet 0     [START ON 12/22/2021] Semaglutide, 1 MG/DOSE, (OZEMPIC, 1 MG/DOSE,) 4 MG/3ML SOPN Inject 1 mg Subcutaneous every 7 days 12 mL 1     sitagliptin (JANUVIA) 100 MG tablet Take 1 tablet (100 mg) by mouth daily 90 tablet 0       Madeline Ortega, Adult-Geriatric Nurse Practitioner  [facility] - Internal Medicine Team     12/6/2021

## 2021-12-06 NOTE — PATIENT INSTRUCTIONS
Continue the Ozempic.    Try over the counter hydrocortisone cream as needed for itching on your abdomen.     Your labs are processing, I will release results on my chart once they are back.    See you back in three months for a diabetes recheck, before then if anything comes up.    Start checking your blood sugar twice daily again for the next two weeks, send me readings via my chart.

## 2022-01-18 DIAGNOSIS — E11.8 TYPE 2 DIABETES MELLITUS WITH COMPLICATION, WITHOUT LONG-TERM CURRENT USE OF INSULIN (H): ICD-10-CM

## 2022-01-18 NOTE — TELEPHONE ENCOUNTER
Reason for Call:  Medication or medication refill:    Do you use a [facility]? No. Name of the pharmacy and phone number for the current request:  [facility] - [address]  [phone]    Name of the medication requested: sitagliptin (JANUVIA) 100 MG tablet    Other request: none    Can we leave a detailed message on this number? YES    Phone number patient can be reached at: Home number on file [phone] (home)    Best Time: any    Call taken on 1/18/2022 at 10:56 AM by Rafiq Hanson

## 2022-02-04 NOTE — PROGRESS NOTES
"Lizabeth is a 66 year old who is being evaluated via a billable telephone visit.      What phone number would you like to be contacted at? [phone]  How would you like to obtain your AVS? [facility]    Assessment & Plan     Travel advice encounter    - azithromycin (ZITHROMAX) 500 MG tablet; Take 1 tablet (500 mg) by mouth daily for 3 doses Take 1 tablet a day for up to 3 days for severe diarrhea      15 minutes spent on the date of the encounter doing chart review, patient visit and documentation        BMI:   Estimated body mass index is 30.24 kg/m  as calculated from the following:    Height as of 12/6/21: 1.588 m (5' 2.5\").    Weight as of 12/6/21: 76.2 kg (168 lb).       See Patient Instructions    No follow-ups on file.    HANG Manuel [facility]    Subjective   Lizabeth is a 66 year old who presents for the following health issues     HPI     Nurse Note      Itinerary:  [facility]      Departure Date: 3 days                  Reason for Travel: Tourism           Urban or rural: both      Accommodations: Hotel        IMMUNIZATION HISTORY  Have you received any immunizations within the past 4 weeks?  Yes  Have you ever fainted from having your blood drawn or from an injection?  No  Have you ever had a fever reaction to vaccination?  No      Subjective  Lizabeth Callahan is a 66 year old female phone today alone for counsultation for international travel.   Patient will be departing in  2-3 day(s)  Patient itinerary :  will be in the urban and rural  region of [facility] which risk for Dengue Fever, food borne illnesses, motor vehicle accidents and Typhoid. exposure.      Patient's activities will include sightseeing, hiking and beach activities (salt water).    Patient's country of birth is [place]    Special medical concerns: see chart  Pre-travel questionnaire was completed by patient and reviewed by provider.     Vitals: There were no vitals taken for this visit.  BMI= There is no height or " weight on file to calculate BMI.    EXAM:  General:  Well-nourished, well-developed in no acute distress.  Appears to be stated age, interacts appropriately and expresses understanding of information given to patient.    Current Outpatient Medications   Medication Sig Dispense Refill     aspirin 81 MG EC tablet [ASPIRIN 81 MG EC TABLET] Take 81 mg by mouth daily.       atorvastatin (LIPITOR) 20 MG tablet Take 1 tablet (20 mg) by mouth daily 90 tablet 3     blood glucose test (UNISTRIP1 TEST STRIP) strips [BLOOD GLUCOSE TEST (UNISTRIP1 TEST STRIP) STRIPS] TEST BLOOD SUGARS ONE TIME DAILY 1 strip PRN     blood sugar diagnostic (GLUCOSE BLOOD) Strp [BLOOD SUGAR DIAGNOSTIC (GLUCOSE BLOOD) STRP] Patient Test One Time Daily-UNISTRIP TEST STRIPS AND LANCETS. DX: Type 2  strip 3     cinnamon bark (CINNAMON) 500 mg capsule [CINNAMON BARK (CINNAMON) 500 MG CAPSULE] Take 500 mg by mouth daily.       dapagliflozin (FARXIGA) 5 MG TABS tablet Take 1 tablet (5 mg) by mouth daily 90 tablet 0     DULoxetine (CYMBALTA) 20 MG capsule Take 1 capsule (20 mg) by mouth At Bedtime 90 capsule 0     gabapentin (NEURONTIN) 300 MG capsule Take 3 capsules (900 mg) by mouth At Bedtime 270 capsule 3     hydrochlorothiazide (HYDRODIURIL) 25 MG tablet Take 1 tablet (25 mg) by mouth daily 90 tablet 0     insulin pen needle (32G X 4 MM) 32G X 4 MM miscellaneous Use 1 pen needles daily or as directed. 100 each 3     lisinopril (ZESTRIL) 40 MG tablet Take 1 tablet (40 mg) by mouth daily 90 tablet 3     multivit with minerals/lutein (MULTIVITAMIN 50 PLUS ORAL) [MULTIVIT WITH MINERALS/LUTEIN (MULTIVITAMIN 50 PLUS ORAL)] multivitamin   once daily       nystatin (MYCOSTATIN) 083966 UNIT/GM external powder Apply topically 3 times daily as needed 15 g 3     omeprazole (PRILOSEC) 20 MG DR capsule Take 1 capsule (20 mg) by mouth every morning 90 capsule 3     potassium chloride ER (KLOR-CON M) 20 MEQ CR tablet Take 1 tablet (20 mEq) by mouth 2 times  daily 180 tablet 3     repaglinide (PRANDIN) 2 MG tablet Take 1 tablet (2 mg) by mouth 3 times daily (before meals) 270 tablet 0     Semaglutide, 1 MG/DOSE, (OZEMPIC, 1 MG/DOSE,) 4 MG/3ML SOPN Inject 1 mg Subcutaneous every 7 days 12 mL 1     sitagliptin (JANUVIA) 100 MG tablet Take 1 tablet (100 mg) by mouth daily 90 tablet 0     Patient Active Problem List   Diagnosis     Right Trapezoid Muscle Strain     Osteoarthritis Of The Knee     Seborrheic Keratosis     Diabetes mellitus (H)     Mixed hyperlipidemia     Arthralgias In Multiple Sites     Myalgia And Myositis     Rheumatoid arthritis (H)     Generalized Osteoarthritis Of The Hand     Localized Primary Osteoarthritis Of The Carpometacarpal Joint     Calcaneal Spur     Foot Pain (Soft Tissue)     Joint Pain, Localized In The Knee     Hx pulmonary embolism     Chest pain, unspecified type     Hypertension     Left leg pain     Leg edema, left     Anticoagulation goal of INR 2 to 3     Abnormal findings on diagnostic imaging of liver and biliary tract     Diverticulosis of colon     Hydronephrosis     Insomnia     Undiagnosed cardiac murmurs     Gastroesophageal reflux disease with esophagitis     Headache     Diverticulosis     Encounter for preventive care     Post-void dribbling     Cramp of limb     Dysphagia     History of DVT (deep vein thrombosis)     Hypokalemia     Medically complex patient     Fall     Allergies   Allergen Reactions     Apple Anaphylaxis     Red delicious apple  Red delicious apple       Apple [Apple Fruit Extract] Anaphylaxis     Red delicious apple     Corylus Anaphylaxis     Hazelnut [Nuts] Anaphylaxis     Hazelnuts [Nuts] Anaphylaxis     Niacin Hives     Amoxicillin Nausea     Codeine Nausea     Hydrocodone-Acetaminophen Other (See Comments)     Low blood pressure     Losartan Cough     Sulfa (Sulfonamide Antibiotics) [Sulfa Drugs] Unknown         Immunizations discussed include:   Covid 19: Up to date  Hepatitis A:  Up to  date  Hepatitis B: Not indicated  Influenza: Up to date  Typhoid: Up to date  Rabies: Not indicated  Yellow Fever: Not indicated  Japanese Encephalitis: Not indicated  Meningococcus: Not indicated  Tetanus/Diphtheria: Up to date  Measles/Mumps/Rubella: Immune by disease history per patient report  Cholera: Not needed  Polio: Up to date  Pneumococcal: Up to date  Varicella: Immune by disease history per patient report  Shingrix: deferred  HPV:  Not indicated     TB: low risk   ASSESSMENT/PLAN:  Lizabeth was seen today for travel clinic.    Diagnoses and all orders for this visit:    Travel advice encounter  -     azithromycin (ZITHROMAX) 500 MG tablet; Take 1 tablet (500 mg) by mouth daily for 3 doses Take 1 tablet a day for up to 3 days for severe diarrhea      I have reviewed general recommendations for safe travel   including: food/water precautions, insect precautions,  roadway safety. Educational materials and [employer] report provided.    Malaraia prophylaxis recommended: none  Symptomatic treatment for traveler's diarrhea: azithromycin    Personal protective measures reviewed including hand sanitizing and contact precautions for the prevention of viral illnesses. Cover coughs and masking  during travel and upon return.  Current COVID 19 pandemic.   Monitor / follow current CDC guidelines.        Evacuation insurance advised and resources were provided to patient.    Total visit time 15 minutes  with over 50% of time spent counseling patient as detailed above.    Natalie Mcqueen CNP          Review of Systems   Constitutional, HEENT, cardiovascular, pulmonary, gi and gu systems are negative, except as otherwise noted.      Objective           Vitals:  No vitals were obtained today due to virtual visit.    Physical Exam   healthy, alert and no distress  PSYCH: Alert and oriented times 3; coherent speech, normal   rate and volume, able to articulate logical thoughts, able   to abstract reason, no tangential thoughts, no  hallucinations   or delusions  Her affect is normal  RESP: No cough, no audible wheezing, able to talk in full sentences  Remainder of exam unable to be completed due to telephone visits            Phone call duration: 12 minutes  Natalie Mcqueen (Lori) CNP

## 2022-02-09 ENCOUNTER — VIRTUAL VISIT (OUTPATIENT)
Dept: FAMILY MEDICINE | Facility: CLINIC | Age: 67
End: 2022-02-09
Payer: COMMERCIAL

## 2022-02-09 DIAGNOSIS — Z71.84 TRAVEL ADVICE ENCOUNTER: Primary | ICD-10-CM

## 2022-02-09 PROCEDURE — 99213 OFFICE O/P EST LOW 20 MIN: CPT | Mod: 95 | Performed by: NURSE PRACTITIONER

## 2022-02-09 RX ORDER — AZITHROMYCIN 500 MG/1
500 TABLET, FILM COATED ORAL DAILY
Qty: 3 TABLET | Refills: 0 | Status: SHIPPED | OUTPATIENT
Start: 2022-02-09 | End: 2022-02-12

## 2022-02-09 NOTE — PATIENT INSTRUCTIONS
Thank you for visiting the [facility] : [phone]  Today February 9, 2022 you received the  No Vaccines today for phone visit  Is now up to date on Hep A    Follow up vaccine appointments can be made as a NURSE ONLY visit at the Travel Clinic, (BE PREPARED TO WAIT, ) or at designated [facility].    If you are receiving the Rabies vaccines series, it is important that you follow the exact schedule ordered.     Pre-travel     We recommend that you purchase Medical Evacuation Insurance prior to your departure.  Https://wwwnc.cdc.gov/travel/page/insurance    [place] your travel plans with the  Department of State through STEP ( Smart Traveler Enrollment Program ) https://step.state.gov.  STEP is a free service to allow [place] citizens and nationals traveling and living abroad to enroll their trip with the nearest [place]. Embassy or Consulate.    Animal Exposure: Avoid all mammals even if they look healthy.  If there is a bite, scratch or even a lick, wash area immediately with soap and water for 15 minutes and seek medical care within 24 hours for evaluation of Rabies post exposure treatment.  Contact your Medical Evacuation Insurance.    COVID 19 (Sars Cov2) prevention strategies  Physical distancing: Maintain 6 foot (2m) from others.              Avoid large gatherings and public transportation.   Avoid indoor shopping malls, theaters and restaurants   Practice consistent mask wearing covering the nose, mouth and underneath the chin when unable to maintain 6 foot distance from others.  Hand washing: frequent, thorough handwashing with soap and water for 20 seconds (or using a hand  containing 60% alcohol)   Avoid touching face, nose, eyes, mouth unless you have done appropriate hand washing as above.   Clean high touch surfaces with approved disinfectant against Covid 19  (70% Ethanol ) or a bleach solution (add 20 mL (4 teaspoons) of bleach to 1 L (1 quart)  of water;)  Be careful not to breath or touch bleach.      Travel Covid 19 Testing:  updated 12/06/2021  International travelers: Pre-travel: diagnostic testing (antigen or PCR) may be required for entry:  See country specific Embassy websites or airline websites.    [place] ENTRY Requirements: Effective December 6, 2021, all international arrivals to the [place] (regardless of vaccination status or citizenship status) by air are required to have a negative predeparture COVID-19 result from a test taken no more than 1 calendar day prior to departure of the flight to the [place]. Many complex scenarios may result from the 1-day rule. For example, for a flight that arrives in the [place] on a Monday, the test must have been taken no earlier than Sunday local time in the departure city. If the itinerary contains multiple flights, the test can be taken 1 day prior to departure of the first flight or can be taken en route, as long as the connecting airport is not in the [place]. If the test is unable to be taken en route, the traveler will not be able to enter the [place], or if the test is taken en route and is positive, the traveler will have to isolate in that location. If the itinerary contains 1 or more overnight stays en route to the [place], the test must have been taken 1 calendar day before the flight that will enter the [place]; however, if the itinerary has an overnight connection due to limitations in flight availability, retesting will not be required. If the first flight within an itinerary is delayed due to severe weather, aircraft mechanical issues, or other issues outside of the traveler's control, the traveler will only need to be retested if the delay causes the original test to be 24 hours or more past the 1-day window. If a connecting flight is delayed due to any of the above issues, the traveler will only need to be retested if the delay causes the original test to be 48 hours or more past the 1-day window. If a trip of less than 1 day is  made out the [place], a viral test taken in the [place] may be used as a predeparture test as long as the test was taken no more than 1 day prior to rearrival in the [place]; however, if a delay occurs on the return trip and the predeparture test is out of the 1-day window, the traveler will need to be retested before returning to the [place]. Noncitizen nonimmigrants who are unvaccinated remain banned from entry    Post travel: CDC recommends getting tested 3-5 days after your trip AND stay home and self-quarantine for 7 days.      COVID-19 testing scheduling number for pre-travel through [facility]  [phone] (Must have an order). Available 24 hours a day.  You can also schedule through My Chart.     Post-travel illness:  Contact your provider or [facility] if you develop a fever, rash, cough, diarrhea or other symptoms for up to 1 year after travel.  Inform your healthcare provider when and where you traveled to.    Please call the [facility] with any questions [phone]  Or send your provider a 'My Chart' note.

## 2022-02-10 ENCOUNTER — LAB (OUTPATIENT)
Dept: LAB | Facility: CLINIC | Age: 67
End: 2022-02-10
Payer: COMMERCIAL

## 2022-02-10 DIAGNOSIS — Z20.822 ENCOUNTER FOR LABORATORY TESTING FOR COVID-19 VIRUS: ICD-10-CM

## 2022-02-10 PROCEDURE — U0005 INFEC AGEN DETEC AMPLI PROBE: HCPCS

## 2022-02-10 PROCEDURE — U0003 INFECTIOUS AGENT DETECTION BY NUCLEIC ACID (DNA OR RNA); SEVERE ACUTE RESPIRATORY SYNDROME CORONAVIRUS 2 (SARS-COV-2) (CORONAVIRUS DISEASE [COVID-19]), AMPLIFIED PROBE TECHNIQUE, MAKING USE OF HIGH THROUGHPUT TECHNOLOGIES AS DESCRIBED BY CMS-2020-01-R: HCPCS

## 2022-02-11 LAB — SARS-COV-2 RNA RESP QL NAA+PROBE: NEGATIVE

## 2022-03-02 ENCOUNTER — OFFICE VISIT (OUTPATIENT)
Dept: CARDIOLOGY | Facility: CLINIC | Age: 67
End: 2022-03-02
Payer: OTHER GOVERNMENT

## 2022-03-02 VITALS
WEIGHT: 167 LBS | BODY MASS INDEX: 30.06 KG/M2 | RESPIRATION RATE: 16 BRPM | SYSTOLIC BLOOD PRESSURE: 90 MMHG | DIASTOLIC BLOOD PRESSURE: 66 MMHG | HEART RATE: 80 BPM

## 2022-03-02 DIAGNOSIS — E78.2 MIXED HYPERLIPIDEMIA: ICD-10-CM

## 2022-03-02 DIAGNOSIS — E11.9 TYPE 2 DIABETES MELLITUS WITHOUT COMPLICATION, WITHOUT LONG-TERM CURRENT USE OF INSULIN (H): ICD-10-CM

## 2022-03-02 DIAGNOSIS — I38 VALVULAR HEART DISEASE: Primary | ICD-10-CM

## 2022-03-02 DIAGNOSIS — I10 PRIMARY HYPERTENSION: ICD-10-CM

## 2022-03-02 PROCEDURE — 99213 OFFICE O/P EST LOW 20 MIN: CPT | Performed by: INTERNAL MEDICINE

## 2022-03-02 NOTE — PROGRESS NOTES
Assessment/Plan:   1.  Mild rheumatic valvular disease, mild aortic valve stenosis: The patient had no chest pain or shortness of breath.  Clinically she has no symptoms.  Physical examination has no worsening heart murmur.      2.  Essential hypertension: Continue hydrochlorothiazide 25 mg daily, losartan 100 mg daily.  Monitor her blood pressure.     3.  Dyslipidemia: Continue Lipitor.     4.  Type 2 diabetes: No change in her medication today.    Thank you for the opportunity to be involved in the care of Lizabeth Callahan. If you have any questions, please feel free to contact me.  I will see the patient again in 24 months and as needed.    Much or all of the text in this note was generated through the use of Dragon Dictate voice-to-text software. Errors in spelling or words which seem out of context are unintentional. Sound alike errors, in particular, may have escaped editing.       History of Present Illness:   It is my pleasure to see Lizabeth Callahan at the [facility] for routine cardiology follow up.  Lizabeth Callahan is a 66 year old female with a medical history of type 2 diabetes, essential hypertension, dyslipidemia, history of pulmonary embolism and mild rheumatic valvular heart disease.    Viridiana states that she has been doing quite well over last year.  She denies chest pain, shortness of breath, palpitations, dizziness, orthopnea, PND or leg edema.  She has been very active without symptoms.  Her blood pressure and heart rate are well controlled.    Past Medical History:     Patient Active Problem List   Diagnosis     Right Trapezoid Muscle Strain     Osteoarthritis Of The Knee     Seborrheic Keratosis     Diabetes mellitus (H)     Mixed hyperlipidemia     Arthralgias In Multiple Sites     Myalgia And Myositis     Rheumatoid arthritis (H)     Generalized Osteoarthritis Of The Hand     Localized Primary Osteoarthritis Of The Carpometacarpal Joint     Calcaneal Spur      Foot Pain (Soft Tissue)     Joint Pain, Localized In The Knee     Hx pulmonary embolism     Chest pain, unspecified type     Hypertension     Left leg pain     Leg edema, left     Anticoagulation goal of INR 2 to 3     Abnormal findings on diagnostic imaging of liver and biliary tract     Diverticulosis of colon     Hydronephrosis     Insomnia     Undiagnosed cardiac murmurs     Gastroesophageal reflux disease with esophagitis     Headache     Diverticulosis     Encounter for preventive care     Post-void dribbling     Cramp of limb     Dysphagia     History of DVT (deep vein thrombosis)     Hypokalemia     Medically complex patient     Fall       Past Surgical History:     Past Surgical History:   Procedure Laterality Date     BACK SURGERY      x 3     HYSTERECTOMY       RELEASE CARPAL TUNNEL      2015       Family History:     Family History   Problem Relation Age of Onset     Cancer Mother      Cancer Father      Diabetes Brother      Diabetes Sister         Social History:    reports that she has never smoked. She has never used smokeless tobacco. She reports current alcohol use. She reports that she does not use drugs.    Review of Systems:   12 systems are reviewed negative except for in HPI.    Meds:     Current Outpatient Medications:      aspirin 81 MG EC tablet, [ASPIRIN 81 MG EC TABLET] Take 81 mg by mouth daily., Disp: , Rfl:      atorvastatin (LIPITOR) 20 MG tablet, Take 1 tablet (20 mg) by mouth daily, Disp: 90 tablet, Rfl: 3     blood glucose test (UNISTRIP1 TEST STRIP) strips, [BLOOD GLUCOSE TEST (UNISTRIP1 TEST STRIP) STRIPS] TEST BLOOD SUGARS ONE TIME DAILY, Disp: 1 strip, Rfl: PRN     blood sugar diagnostic (GLUCOSE BLOOD) Strp, [BLOOD SUGAR DIAGNOSTIC (GLUCOSE BLOOD) STRP] Patient Test One Time Daily-UNISTRIP TEST STRIPS AND LANCETS. DX: Type 2 DM, Disp: 100 strip, Rfl: 3     cinnamon bark (CINNAMON) 500 mg capsule, [CINNAMON BARK (CINNAMON) 500 MG CAPSULE] Take 500 mg by mouth daily., Disp: ,  Rfl:      dapagliflozin (FARXIGA) 5 MG TABS tablet, Take 1 tablet (5 mg) by mouth daily, Disp: 90 tablet, Rfl: 0     DULoxetine (CYMBALTA) 20 MG capsule, Take 1 capsule (20 mg) by mouth At Bedtime, Disp: 90 capsule, Rfl: 0     gabapentin (NEURONTIN) 300 MG capsule, Take 3 capsules (900 mg) by mouth At Bedtime, Disp: 270 capsule, Rfl: 3     hydrochlorothiazide (HYDRODIURIL) 25 MG tablet, Take 1 tablet (25 mg) by mouth daily, Disp: 90 tablet, Rfl: 0     insulin pen needle (32G X 4 MM) 32G X 4 MM miscellaneous, Use 1 pen needles daily or as directed., Disp: 100 each, Rfl: 3     lisinopril (ZESTRIL) 40 MG tablet, Take 1 tablet (40 mg) by mouth daily, Disp: 90 tablet, Rfl: 3     multivit with minerals/lutein (MULTIVITAMIN 50 PLUS ORAL), [MULTIVIT WITH MINERALS/LUTEIN (MULTIVITAMIN 50 PLUS ORAL)] multivitamin   once daily, Disp: , Rfl:      nystatin (MYCOSTATIN) 507030 UNIT/GM external powder, Apply topically 3 times daily as needed, Disp: 15 g, Rfl: 3     omeprazole (PRILOSEC) 20 MG DR capsule, Take 1 capsule (20 mg) by mouth every morning, Disp: 90 capsule, Rfl: 3     potassium chloride ER (KLOR-CON M) 20 MEQ CR tablet, Take 1 tablet (20 mEq) by mouth 2 times daily, Disp: 180 tablet, Rfl: 3     repaglinide (PRANDIN) 2 MG tablet, Take 1 tablet (2 mg) by mouth 3 times daily (before meals), Disp: 270 tablet, Rfl: 0     Semaglutide, 1 MG/DOSE, (OZEMPIC, 1 MG/DOSE,) 4 MG/3ML SOPN, Inject 1 mg Subcutaneous every 7 days, Disp: 12 mL, Rfl: 1     sitagliptin (JANUVIA) 100 MG tablet, Take 1 tablet (100 mg) by mouth daily, Disp: 90 tablet, Rfl: 0    Allergies:   Apple, Apple [apple fruit extract], Corylus, Hazelnut [nuts], Hazelnuts [nuts], Niacin, Amoxicillin, Codeine, Hydrocodone-acetaminophen, Losartan, and Sulfa (sulfonamide antibiotics) [sulfa drugs]      Objective:      Physical Exam  75.8 kg (167 lb)     Body mass index is 30.06 kg/m .  BP 90/66 (BP Location: Left arm, Patient Position: Sitting, Cuff Size: Adult Regular)    Pulse 80   Resp 16   Wt 75.8 kg (167 lb)   BMI 30.06 kg/m      General Appearance:   Awake, Alert, No acute distress.   HEENT:  Pupil equal and reactive to light. No scleral icterus; the mucous membranes were moist.   Neck: No cervical bruits. No JVD. No thyromegaly.     Chest: The spine was straight. The chest was symmetric.   Lungs:   Respirations unlabored; Lungs are clear to auscultation. No crackles. No wheezing.   Cardiovascular:   Regular rhythm and rate, normal first and second heart sounds with I/VI systolic murmurs at RUSB. No rubs or gallops.    Abdomen:  Soft. No tenderness. Non-distended. Bowels sounds are present   Extremities: Equal tibial pulses. No leg edema.   Skin: No rashes or ulcers. Warm, Dry.   Musculoskeletal: No tenderness. No deformity.   Neurologic: Mood and affect are appropriate. No focal deficits.         EKG:  Personally reivewed  Sinus rhythm with Possible Premature atrial complexes with Aberrant conduction   Leftward axis   Septal infarct (cited on or before 09-MAY-2018)   Abnormal ECG   When compared with ECG of 09-MAY-2018 09:05,   Aberrant conduction is now Present   Questionable change in initial forces of Septal leads     Cardiac Imaging Studies  ECHO on 5-:    Left Ventricle: Normal left ventricular size.The estimated left ventricular ejection fraction is 65%. This represents a normal ejection fraction. Moderate sigmoid septum hypertrophy noted. E/e' > 15, suggesting elevated LV filling pressures.    Left Atrium: Left atrial volume is mildly increased.    Normal right ventricular size and systolic function.    Thoracic Aorta: The ascending aorta is mildly dilated.    Normal central venous pressure.    The aortic valve is sclerotic without reduced excursion. Mild aortic regurgitation.    No hemodynamically significant valvular heart abnormalities.    No previous study for comparison.     Stress cardiac MRI on 1-:  1.  Lexiscan stress cardiac MRI is negative for  inducible myocardial ischemia.   2.  Lexiscan stress ECG is negative for inducible myocardial ischemia.  3.  No previous myocardial infarction is identified.  4.  Normal left ventricular size, wall thickness and function. The calculated left ventricular ejection fraction is 60%.  5.  Normal right ventricular size and systolic function.  6.  Moderately enlarged left atrium.  7.  Rheumatic valve pathological change in mitral, aortic and tricuspid valves. Mild aortic valve stenosis.     Lab Review   Lab Results   Component Value Date     12/06/2021    CO2 24 12/06/2021    BUN 22 12/06/2021     Lab Results   Component Value Date    WBC 7.3 10/20/2021    HGB 14.9 10/20/2021    HCT 43.6 10/20/2021    MCV 89 10/20/2021     10/20/2021     Lab Results   Component Value Date    CHOL 164 10/20/2021    CHOL 173 12/23/2020    CHOL 130 04/06/2015     Lab Results   Component Value Date    HDL 39 (L) 10/20/2021    HDL 46 (L) 12/23/2020    HDL 53 04/06/2015     No components found for: LDLCALC  Lab Results   Component Value Date    TRIG 130 10/20/2021    TRIG 148 12/23/2020    TRIG 76 04/06/2015     No components found for: CHOLHDL  Lab Results   Component Value Date    TROPONINI 0.01 05/10/2018     Lab Results   Component Value Date     05/09/2018     Lab Results   Component Value Date    TSH 2.38 05/09/2018

## 2022-03-02 NOTE — LETTER
3/2/2022    Madeline Shannon, CNP  [address] [place] [address]    RE: Lizabeth Callahan       Dear Colleague,     I had the pleasure of seeing Lizabeth Callahan in the [facility].            Assessment/Plan:   1.  Mild rheumatic valvular disease, mild aortic valve stenosis: The patient had no chest pain or shortness of breath.  Clinically she has no symptoms.  Physical examination has no worsening heart murmur.      2.  Essential hypertension: Continue hydrochlorothiazide 25 mg daily, losartan 100 mg daily.  Monitor her blood pressure.     3.  Dyslipidemia: Continue Lipitor.     4.  Type 2 diabetes: No change in her medication today.    Thank you for the opportunity to be involved in the care of Lizabeth Callahan. If you have any questions, please feel free to contact me.  I will see the patient again in 24 months and as needed.    Much or all of the text in this note was generated through the use of Dragon Dictate voice-to-text software. Errors in spelling or words which seem out of context are unintentional. Sound alike errors, in particular, may have escaped editing.       History of Present Illness:   It is my pleasure to see Lizabeth Callahan at the [facility] for routine cardiology follow up.  Lizabeth Callahan is a 66 year old female with a medical history of type 2 diabetes, essential hypertension, dyslipidemia, history of pulmonary embolism and mild rheumatic valvular heart disease.    Viridiana states that she has been doing quite well over last year.  She denies chest pain, shortness of breath, palpitations, dizziness, orthopnea, PND or leg edema.  She has been very active without symptoms.  Her blood pressure and heart rate are well controlled.    Past Medical History:     Patient Active Problem List   Diagnosis     Right Trapezoid Muscle Strain     Osteoarthritis Of The Knee     Seborrheic Keratosis     Diabetes mellitus (H)     Mixed hyperlipidemia     Arthralgias  In Multiple Sites     Myalgia And Myositis     Rheumatoid arthritis (H)     Generalized Osteoarthritis Of The Hand     Localized Primary Osteoarthritis Of The Carpometacarpal Joint     Calcaneal Spur     Foot Pain (Soft Tissue)     Joint Pain, Localized In The Knee     Hx pulmonary embolism     Chest pain, unspecified type     Hypertension     Left leg pain     Leg edema, left     Anticoagulation goal of INR 2 to 3     Abnormal findings on diagnostic imaging of liver and biliary tract     Diverticulosis of colon     Hydronephrosis     Insomnia     Undiagnosed cardiac murmurs     Gastroesophageal reflux disease with esophagitis     Headache     Diverticulosis     Encounter for preventive care     Post-void dribbling     Cramp of limb     Dysphagia     History of DVT (deep vein thrombosis)     Hypokalemia     Medically complex patient     Fall       Past Surgical History:     Past Surgical History:   Procedure Laterality Date     BACK SURGERY      x 3     HYSTERECTOMY       RELEASE CARPAL TUNNEL      2015       Family History:     Family History   Problem Relation Age of Onset     Cancer Mother      Cancer Father      Diabetes Brother      Diabetes Sister         Social History:    reports that she has never smoked. She has never used smokeless tobacco. She reports current alcohol use. She reports that she does not use drugs.    Review of Systems:   12 systems are reviewed negative except for in HPI.    Meds:     Current Outpatient Medications:      aspirin 81 MG EC tablet, [ASPIRIN 81 MG EC TABLET] Take 81 mg by mouth daily., Disp: , Rfl:      atorvastatin (LIPITOR) 20 MG tablet, Take 1 tablet (20 mg) by mouth daily, Disp: 90 tablet, Rfl: 3     blood glucose test (UNISTRIP1 TEST STRIP) strips, [BLOOD GLUCOSE TEST (UNISTRIP1 TEST STRIP) STRIPS] TEST BLOOD SUGARS ONE TIME DAILY, Disp: 1 strip, Rfl: PRN     blood sugar diagnostic (GLUCOSE BLOOD) Strp, [BLOOD SUGAR DIAGNOSTIC (GLUCOSE BLOOD) STRP] Patient Test One Time  Daily-UNISTRIP TEST STRIPS AND LANCETS. DX: Type 2 DM, Disp: 100 strip, Rfl: 3     cinnamon bark (CINNAMON) 500 mg capsule, [CINNAMON BARK (CINNAMON) 500 MG CAPSULE] Take 500 mg by mouth daily., Disp: , Rfl:      dapagliflozin (FARXIGA) 5 MG TABS tablet, Take 1 tablet (5 mg) by mouth daily, Disp: 90 tablet, Rfl: 0     DULoxetine (CYMBALTA) 20 MG capsule, Take 1 capsule (20 mg) by mouth At Bedtime, Disp: 90 capsule, Rfl: 0     gabapentin (NEURONTIN) 300 MG capsule, Take 3 capsules (900 mg) by mouth At Bedtime, Disp: 270 capsule, Rfl: 3     hydrochlorothiazide (HYDRODIURIL) 25 MG tablet, Take 1 tablet (25 mg) by mouth daily, Disp: 90 tablet, Rfl: 0     insulin pen needle (32G X 4 MM) 32G X 4 MM miscellaneous, Use 1 pen needles daily or as directed., Disp: 100 each, Rfl: 3     lisinopril (ZESTRIL) 40 MG tablet, Take 1 tablet (40 mg) by mouth daily, Disp: 90 tablet, Rfl: 3     multivit with minerals/lutein (MULTIVITAMIN 50 PLUS ORAL), [MULTIVIT WITH MINERALS/LUTEIN (MULTIVITAMIN 50 PLUS ORAL)] multivitamin   once daily, Disp: , Rfl:      nystatin (MYCOSTATIN) 617435 UNIT/GM external powder, Apply topically 3 times daily as needed, Disp: 15 g, Rfl: 3     omeprazole (PRILOSEC) 20 MG DR capsule, Take 1 capsule (20 mg) by mouth every morning, Disp: 90 capsule, Rfl: 3     potassium chloride ER (KLOR-CON M) 20 MEQ CR tablet, Take 1 tablet (20 mEq) by mouth 2 times daily, Disp: 180 tablet, Rfl: 3     repaglinide (PRANDIN) 2 MG tablet, Take 1 tablet (2 mg) by mouth 3 times daily (before meals), Disp: 270 tablet, Rfl: 0     Semaglutide, 1 MG/DOSE, (OZEMPIC, 1 MG/DOSE,) 4 MG/3ML SOPN, Inject 1 mg Subcutaneous every 7 days, Disp: 12 mL, Rfl: 1     sitagliptin (JANUVIA) 100 MG tablet, Take 1 tablet (100 mg) by mouth daily, Disp: 90 tablet, Rfl: 0    Allergies:   Apple, Apple [apple fruit extract], Corylus, Hazelnut [nuts], Hazelnuts [nuts], Niacin, Amoxicillin, Codeine, Hydrocodone-acetaminophen, Losartan, and Sulfa (sulfonamide  antibiotics) [sulfa drugs]      Objective:      Physical Exam  75.8 kg (167 lb)     Body mass index is 30.06 kg/m .  BP 90/66 (BP Location: Left arm, Patient Position: Sitting, Cuff Size: Adult Regular)   Pulse 80   Resp 16   Wt 75.8 kg (167 lb)   BMI 30.06 kg/m      General Appearance:   Awake, Alert, No acute distress.   HEENT:  Pupil equal and reactive to light. No scleral icterus; the mucous membranes were moist.   Neck: No cervical bruits. No JVD. No thyromegaly.     Chest: The spine was straight. The chest was symmetric.   Lungs:   Respirations unlabored; Lungs are clear to auscultation. No crackles. No wheezing.   Cardiovascular:   Regular rhythm and rate, normal first and second heart sounds with I/VI systolic murmurs at RUSB. No rubs or gallops.    Abdomen:  Soft. No tenderness. Non-distended. Bowels sounds are present   Extremities: Equal tibial pulses. No leg edema.   Skin: No rashes or ulcers. Warm, Dry.   Musculoskeletal: No tenderness. No deformity.   Neurologic: Mood and affect are appropriate. No focal deficits.         EKG:  Personally reivewed  Sinus rhythm with Possible Premature atrial complexes with Aberrant conduction   Leftward axis   Septal infarct (cited on or before 09-MAY-2018)   Abnormal ECG   When compared with ECG of 09-MAY-2018 09:05,   Aberrant conduction is now Present   Questionable change in initial forces of Septal leads     Cardiac Imaging Studies  ECHO on 5-:    Left Ventricle: Normal left ventricular size.The estimated left ventricular ejection fraction is 65%. This represents a normal ejection fraction. Moderate sigmoid septum hypertrophy noted. E/e' > 15, suggesting elevated LV filling pressures.    Left Atrium: Left atrial volume is mildly increased.    Normal right ventricular size and systolic function.    Thoracic Aorta: The ascending aorta is mildly dilated.    Normal central venous pressure.    The aortic valve is sclerotic without reduced excursion. Mild  aortic regurgitation.    No hemodynamically significant valvular heart abnormalities.    No previous study for comparison.     Stress cardiac MRI on 1-:  1.  Lexiscan stress cardiac MRI is negative for inducible myocardial ischemia.   2.  Lexiscan stress ECG is negative for inducible myocardial ischemia.  3.  No previous myocardial infarction is identified.  4.  Normal left ventricular size, wall thickness and function. The calculated left ventricular ejection fraction is 60%.  5.  Normal right ventricular size and systolic function.  6.  Moderately enlarged left atrium.  7.  Rheumatic valve pathological change in mitral, aortic and tricuspid valves. Mild aortic valve stenosis.     Lab Review   Lab Results   Component Value Date     12/06/2021    CO2 24 12/06/2021    BUN 22 12/06/2021     Lab Results   Component Value Date    WBC 7.3 10/20/2021    HGB 14.9 10/20/2021    HCT 43.6 10/20/2021    MCV 89 10/20/2021     10/20/2021     Lab Results   Component Value Date    CHOL 164 10/20/2021    CHOL 173 12/23/2020    CHOL 130 04/06/2015     Lab Results   Component Value Date    HDL 39 (L) 10/20/2021    HDL 46 (L) 12/23/2020    HDL 53 04/06/2015     No components found for: LDLCALC  Lab Results   Component Value Date    TRIG 130 10/20/2021    TRIG 148 12/23/2020    TRIG 76 04/06/2015     No components found for: CHOLHDL  Lab Results   Component Value Date    TROPONINI 0.01 05/10/2018     Lab Results   Component Value Date     05/09/2018     Lab Results   Component Value Date    TSH 2.38 05/09/2018                 Thank you for allowing me to participate in the care of your patient.      Sincerely,     Mark Shaver MD     [facility]  cc:   No referring provider defined for this encounter.

## 2022-07-17 ENCOUNTER — HEALTH MAINTENANCE LETTER (OUTPATIENT)
Age: 67
End: 2022-07-17

## 2022-09-24 ENCOUNTER — HEALTH MAINTENANCE LETTER (OUTPATIENT)
Age: 67
End: 2022-09-24

## 2022-12-19 ENCOUNTER — LAB REQUISITION (OUTPATIENT)
Dept: LAB | Facility: CLINIC | Age: 67
End: 2022-12-19
Payer: COMMERCIAL

## 2022-12-19 DIAGNOSIS — M25.539 PAIN IN UNSPECIFIED WRIST: ICD-10-CM

## 2022-12-19 LAB
GRAM STAIN RESULT: NORMAL
GRAM STAIN RESULT: NORMAL

## 2022-12-19 PROCEDURE — 87070 CULTURE OTHR SPECIMN AEROBIC: CPT | Mod: ORL | Performed by: PHYSICIAN ASSISTANT

## 2022-12-19 PROCEDURE — 87205 SMEAR GRAM STAIN: CPT | Mod: ORL | Performed by: PHYSICIAN ASSISTANT

## 2022-12-19 PROCEDURE — 87075 CULTR BACTERIA EXCEPT BLOOD: CPT | Mod: ORL | Performed by: PHYSICIAN ASSISTANT

## 2022-12-21 LAB — BACTERIA WND CULT: NO GROWTH

## 2022-12-26 LAB — BACTERIA WND CULT: ABNORMAL

## 2023-01-30 ENCOUNTER — HEALTH MAINTENANCE LETTER (OUTPATIENT)
Age: 68
End: 2023-01-30

## 2023-08-05 ENCOUNTER — HEALTH MAINTENANCE LETTER (OUTPATIENT)
Age: 68
End: 2023-08-05

## 2023-10-26 ENCOUNTER — TRANSFERRED RECORDS (OUTPATIENT)
Dept: HEALTH INFORMATION MANAGEMENT | Facility: CLINIC | Age: 68
End: 2023-10-26

## 2023-10-26 LAB
ALT SERPL-CCNC: 24 U/L
AST SERPL-CCNC: 27 U/L (ref 14–36)
CHOLESTEROL (EXTERNAL): 162 MG/DL (ref 0–200)
CREATININE (EXTERNAL): 1 MG/DL (ref 0.7–1.4)
GLUCOSE (EXTERNAL): 169 MG/DL (ref 60–99)
HBA1C MFR BLD: 7.1 % (ref 4–5.6)
HDLC SERPL-MCNC: 40 MG/DL (ref 35–65)
LDL CHOLESTEROL CALCULATED (EXTERNAL): 85 MG/DL (ref 0–130)
POTASSIUM (EXTERNAL): 3.9 MMOL/L (ref 3.5–5.1)
TRIGLYCERIDES (EXTERNAL): 183 MG/DL (ref 0–200)

## 2023-12-23 ENCOUNTER — HEALTH MAINTENANCE LETTER (OUTPATIENT)
Age: 68
End: 2023-12-23

## 2023-12-27 ENCOUNTER — TRANSFERRED RECORDS (OUTPATIENT)
Dept: HEALTH INFORMATION MANAGEMENT | Facility: CLINIC | Age: 68
End: 2023-12-27
Payer: MEDICARE

## 2023-12-28 ENCOUNTER — TRANSFERRED RECORDS (OUTPATIENT)
Dept: HEALTH INFORMATION MANAGEMENT | Facility: CLINIC | Age: 68
End: 2023-12-28
Payer: MEDICARE

## 2023-12-28 ENCOUNTER — MEDICAL CORRESPONDENCE (OUTPATIENT)
Dept: HEALTH INFORMATION MANAGEMENT | Facility: CLINIC | Age: 68
End: 2023-12-28
Payer: MEDICARE

## 2024-03-02 ENCOUNTER — HEALTH MAINTENANCE LETTER (OUTPATIENT)
Age: 69
End: 2024-03-02

## 2024-07-14 ENCOUNTER — HEALTH MAINTENANCE LETTER (OUTPATIENT)
Age: 69
End: 2024-07-14

## 2025-02-15 ENCOUNTER — HEALTH MAINTENANCE LETTER (OUTPATIENT)
Age: 70
End: 2025-02-15

## 2025-03-15 ENCOUNTER — HEALTH MAINTENANCE LETTER (OUTPATIENT)
Age: 70
End: 2025-03-15